# Patient Record
Sex: MALE | URBAN - METROPOLITAN AREA
[De-identification: names, ages, dates, MRNs, and addresses within clinical notes are randomized per-mention and may not be internally consistent; named-entity substitution may affect disease eponyms.]

---

## 2017-01-26 ENCOUNTER — TRANSFERRED RECORDS (OUTPATIENT)
Dept: HEALTH INFORMATION MANAGEMENT | Facility: CLINIC | Age: 32
End: 2017-01-26

## 2017-02-17 ENCOUNTER — TRANSFERRED RECORDS (OUTPATIENT)
Dept: HEALTH INFORMATION MANAGEMENT | Facility: CLINIC | Age: 32
End: 2017-02-17

## 2017-02-22 ENCOUNTER — TRANSFERRED RECORDS (OUTPATIENT)
Dept: HEALTH INFORMATION MANAGEMENT | Facility: CLINIC | Age: 32
End: 2017-02-22

## 2017-02-23 ENCOUNTER — MEDICAL CORRESPONDENCE (OUTPATIENT)
Dept: HEALTH INFORMATION MANAGEMENT | Facility: CLINIC | Age: 32
End: 2017-02-23

## 2017-02-23 ENCOUNTER — TRANSFERRED RECORDS (OUTPATIENT)
Dept: HEALTH INFORMATION MANAGEMENT | Facility: CLINIC | Age: 32
End: 2017-02-23

## 2017-03-03 ENCOUNTER — PRE VISIT (OUTPATIENT)
Dept: UROLOGY | Facility: CLINIC | Age: 32
End: 2017-03-03

## 2017-03-10 ENCOUNTER — TRANSFERRED RECORDS (OUTPATIENT)
Dept: HEALTH INFORMATION MANAGEMENT | Facility: CLINIC | Age: 32
End: 2017-03-10

## 2017-03-22 ENCOUNTER — PRE VISIT (OUTPATIENT)
Dept: OTOLARYNGOLOGY | Facility: CLINIC | Age: 32
End: 2017-03-22

## 2017-03-22 NOTE — TELEPHONE ENCOUNTER
1.  Date/reason for appt: 4/10/17 conductive hearing loss   2.  Referring provider: AUDRA PATEL   3.  Call to patient (Yes / No - short description): no, referral   4.  Previous care at / records requested from: [facility]   5.  Other: Records received from [facility]. Requested image.   Included  Office notes: 3/10/17, 2/17/17, 1/20/17, 12/26/16   Radiology reports: CT temporal bones iacs on 2/22/17  Other: audiogram on 2/17/17, 12/14/16

## 2017-03-22 NOTE — TELEPHONE ENCOUNTER
Radiology reports received from [facility]. Notified the film room to pull images.  CT temporal bones on 2/22/17  CT head on 1/26/17, 1/25/17

## 2017-04-18 ENCOUNTER — OFFICE VISIT (OUTPATIENT)
Dept: UROLOGY | Facility: CLINIC | Age: 32
End: 2017-04-18

## 2017-04-18 VITALS
SYSTOLIC BLOOD PRESSURE: 118 MMHG | HEIGHT: 69 IN | BODY MASS INDEX: 34.8 KG/M2 | DIASTOLIC BLOOD PRESSURE: 73 MMHG | HEART RATE: 52 BPM | WEIGHT: 235 LBS

## 2017-04-18 DIAGNOSIS — N41.1 CHRONIC PROSTATITIS: Primary | ICD-10-CM

## 2017-04-18 LAB
ALBUMIN UR-MCNC: NEGATIVE MG/DL
APPEARANCE UR: CLEAR
BILIRUB UR QL STRIP: NEGATIVE
COLOR UR AUTO: YELLOW
GLUCOSE UR STRIP-MCNC: NEGATIVE MG/DL
HGB UR QL STRIP: NEGATIVE
KETONES UR STRIP-MCNC: NEGATIVE MG/DL
LEUKOCYTE ESTERASE UR QL STRIP: NEGATIVE
MUCOUS THREADS #/AREA URNS LPF: PRESENT /LPF
NITRATE UR QL: NEGATIVE
PH UR STRIP: 6 PH (ref 5–7)
RBC #/AREA URNS AUTO: <1 /HPF (ref 0–2)
SP GR UR STRIP: 1.01 (ref 1–1.03)
URN SPEC COLLECT METH UR: ABNORMAL
UROBILINOGEN UR STRIP-MCNC: 0 MG/DL (ref 0–2)
WBC #/AREA URNS AUTO: <1 /HPF (ref 0–2)

## 2017-04-18 PROCEDURE — 87591 N.GONORRHOEAE DNA AMP PROB: CPT | Performed by: UROLOGY

## 2017-04-18 PROCEDURE — 87491 CHLMYD TRACH DNA AMP PROBE: CPT | Performed by: UROLOGY

## 2017-04-18 RX ORDER — FLUTICASONE PROPIONATE 50 MCG
SPRAY, SUSPENSION (ML) NASAL
COMMUNITY
Start: 2017-02-02

## 2017-04-18 RX ORDER — CETIRIZINE HYDROCHLORIDE 10 MG/1
TABLET ORAL
COMMUNITY
Start: 2017-02-02

## 2017-04-18 RX ORDER — VELPATASVIR AND SOFOSBUVIR 100; 400 MG/1; MG/1
TABLET, FILM COATED ORAL
COMMUNITY
Start: 2017-02-02 | End: 2017-06-08

## 2017-04-18 RX ORDER — IBUPROFEN 600 MG/1
600 TABLET, FILM COATED ORAL EVERY 6 HOURS PRN
Qty: 60 TABLET | Refills: 1 | Status: SHIPPED | OUTPATIENT
Start: 2017-04-18

## 2017-04-18 ASSESSMENT — ENCOUNTER SYMPTOMS
FLANK PAIN: 1
DIZZINESS: 0
INCREASED ENERGY: 1
SYNCOPE: 0
POLYPHAGIA: 0
HEADACHES: 0
HEMATURIA: 0
WEIGHT GAIN: 1
CHILLS: 0
LEG PAIN: 0
COUGH DISTURBING SLEEP: 0
MEMORY LOSS: 0
EXERCISE INTOLERANCE: 0
SPUTUM PRODUCTION: 0
DISTURBANCES IN COORDINATION: 0
SLEEP DISTURBANCES DUE TO BREATHING: 0
ORTHOPNEA: 0
NIGHT SWEATS: 0
SNORES LOUDLY: 0
SHORTNESS OF BREATH: 1
FATIGUE: 1
WEAKNESS: 1
WEIGHT LOSS: 0
DYSURIA: 1
POSTURAL DYSPNEA: 0
SPEECH CHANGE: 0
RESPIRATORY PAIN: 0
HYPERTENSION: 0
TREMORS: 0
DECREASED APPETITE: 0
LOSS OF CONSCIOUSNESS: 0
HALLUCINATIONS: 0
CLAUDICATION: 0
HYPOTENSION: 0
DIFFICULTY URINATING: 1
POLYDIPSIA: 0
TINGLING: 1
PARALYSIS: 0
LIGHT-HEADEDNESS: 1
DYSPNEA ON EXERTION: 1
PALPITATIONS: 1
COUGH: 0
WHEEZING: 0
ALTERED TEMPERATURE REGULATION: 0
HEMOPTYSIS: 0
TACHYCARDIA: 0
FEVER: 0
LEG SWELLING: 0
SEIZURES: 0
NUMBNESS: 0

## 2017-04-18 ASSESSMENT — PAIN SCALES - GENERAL: PAINLEVEL: MILD PAIN (3)

## 2017-04-18 NOTE — MR AVS SNAPSHOT
After Visit Summary   4/18/2017    Mikhail Bustos    MRN: [id number]           Patient Information     Date Of Birth          1985        Visit Information        Provider Department      4/18/2017 2:00 PM Vineet Quintero MD [facility] Urology and [facility] for Prostate and Urologic Cancers        Today's Diagnoses     Chronic prostatitis    -  1      Care Instructions    Schedule imaging and follow up with Dr Quintero to discuss results    It was a pleasure meeting with you today.  Thank you for allowing me and my team the privilege of caring for you today.  YOU are the reason we are here, and I truly hope we provided you with the excellent service you deserve.  Please let us know if there is anything else we can do for you so that we can be sure you are leaving completely satisfied with your care experience.                Follow-ups after your visit        Your next 10 appointments already scheduled     May 04, 2017  2:00 PM CDT   (Arrive by 1:45 PM)   NEW NEUROTOLOGY VISIT with Maria Guadalupe Bashir MD   [facility] Ear Nose and Throat ([facility])    [address] [place] [address] [phone]            May 23, 2017  1:00 PM CDT   (Arrive by 12:45 PM)   XR URETHROGRAM RETROGRADE with UCXR2, UC GIGU [facility] Xray ([facility])    [address] [place] [address] [phone]           Please bring a list of your current medicines to your exam. (Include vitamins, minerals and over-thecounter medicines.) Leave your valuables at home.  Tell your doctor if there is a chance you may be pregnant.  You do not need to do anything special for this exam.            May 23, 2017  1:30 PM CDT   XR CYSTOGRAM VOIDING with UCXR2, UC GIGU RAD   [facility] Imaging Center Xray ([facility])    [address] [address] [phone]            Please bring a list of your current medicines to your exam. (Include vitamins, minerals and over-thecounter medicines.) Leave your valuables at home.  Tell your doctor if there is a chance you may be pregnant.  You do not need to do anything special for this exam.            May 23, 2017  3:00 PM CDT   (Arrive by 2:45 PM)   Return Visit with Vineet Quintero MD   [facility] Urology and [email or web address]] for Prostate and Urologic Cancers ([facility])    [address] [facility] [address] [phone]              Future tests that were ordered for you today     Open Future Orders        Priority Expected Expires Ordered    Routine UA with micro reflex to culture Routine  4/18/2018 4/18/2017    X-Ray Voiding cystogram Routine 4/18/2017 4/18/2018 4/18/2017    XR Urethrogram Retrograde Routine 4/18/2017 4/18/2018 4/18/2017            Who to contact     Please call your clinic at [phone] to:    Ask questions about your health    Make or cancel appointments    Discuss your medicines    Learn about your test results    Speak to your doctor   If you have compliments or concerns about an experience at your clinic, or if you wish to file a complaint, please contact [facility] Patient Relations at [phone] or email us at [email or web address]         Additional Information About Your Visit        SeeSaw Networkshart Information     Eayun is an electronic gateway that provides easy, online access to your medical records. With Eayun, you can request a clinic appointment, read your test results, renew a prescription or communicate with your care team.     To sign up for WiChorust visit the website at [email or web address]   You will be asked to enter the access code listed below, as well as some personal information. Please follow the directions to create your username and password.     Your access code is: [email or web address]  Expires: 6/25/2017  6:30  "AM     Your access code will  in 90 days. If you need help or a new code, please contact your [facility] or call [phone] for assistance.        Care EveryWhere ID     This is your Care EveryWhere ID. This could be used by other organizations to access your [place] medical records  [id number]        Your Vitals Were     Pulse Height BMI (Body Mass Index)             52 1.753 m (5' 9\") 34.7 kg/m2          Blood Pressure from Last 3 Encounters:   17 118/73    Weight from Last 3 Encounters:   17 106.6 kg (235 lb)              We Performed the Following     Chlamydia trachomatis PCR     Neisseria gonorrhoeae PCR          Today's Medication Changes          These changes are accurate as of: 17  3:16 PM.  If you have any questions, ask your nurse or doctor.               Start taking these medicines.        Dose/Directions    ibuprofen 600 MG tablet   Commonly known as:  ADVIL/MOTRIN   Used for:  Chronic prostatitis   Started by:  Vineet Quintero MD        Dose:  600 mg   Take 1 tablet (600 mg) by mouth every 6 hours as needed for moderate pain   Quantity:  60 tablet   Refills:  1            Where to get your medicines      These medications were sent to [facility] - [place], [place] - [address] [address]     Phone:  [phone]     ibuprofen 600 MG tablet                Primary Care Provider Office Phone # Fax #    Ming Cervantes [phone]       [facility]  [address] [facility] [address]        Thank you!     Thank you for choosing [facility] UROLOGY AND [facility] FOR PROSTATE AND UROLOGIC CANCERS  for your care. Our goal is always to provide you with excellent care. Hearing back from our patients is one way we can continue to improve our services. Please take a few minutes to complete the written survey that you may receive in the mail after your visit with us. Thank you!           "   Your Updated Medication List - Protect others around you: Learn how to safely use, store and throw away your medicines at www.disposemymeds.org.          This list is accurate as of: 4/18/17  3:16 PM.  Always use your most recent med list.                   Brand Name Dispense Instructions for use    EPCLUSA 400-100 MG per tablet   Generic drug:  sofosbuvir-velpatasvir          FISH OIL PO          FLONASE 50 MCG/ACT spray   Generic drug:  fluticasone          ibuprofen 600 MG tablet    ADVIL/MOTRIN    60 tablet    Take 1 tablet (600 mg) by mouth every 6 hours as needed for moderate pain       ZYRTEC ALLERGY 10 MG tablet   Generic drug:  cetirizine

## 2017-04-18 NOTE — LETTER
4/18/2017       RE: Mikhail Bustos  [address] [facility] [address]  [facility] [address]     Dear Colleague,    Thank you for referring your patient, Mikhail Bustos, to the [facility] UROLOGY AND INST FOR PROSTATE AND UROLOGIC CANCERS at [facility]. Please see a copy of my visit note below.          Chief Complaint:   Pelvic Pain         Consult or Referral:   Mr. Mikhail Bustos is a 31 year old male seen in consultation from Dr. Haynes         History of Present Illness:    Mikhail Bustos is a very pleasant 31 year old male who presents with a history of lower anterior pelvic pain at the level of the pubic sympysis.  His urologic history leading up to this is somewhat complex.    Symptoms started approx 1 year ago in the context of meth abuse, excess sexual activity and frequent vigorous masterbation with prolonged sessions of sexual arousal and intentionally delayed ejaculation.    During one such session a year ago he was about to have orgasm when he noted immediate detumescence in the penis.  He did feel a pop in the lower abdomen. Was subsequently still able to have erections, sex, no bruising but did have gross hematuria seveal week johnson after having sex.    Subsequent to this experience he endorses near daily/contastant pain at the level of the suspensory ligament of the penis.  He does still have erections and no further hematuria.  He also has vey bothersome overactivity with need to void nearly every hour and sensation that his urinary stream is slow    He had this evaluated by another urologist who performed an extensive  hematuria evaluation with cysto, CT urogram, cytology.  He has also head a head CT and MRI without abnromality.    Has been on flomax with no improevemnt in symotoms    He denies hx of kidney stones, UTI or STD.         Past Medical History:   Hep C         Past Surgical History:   None         Medications     Current Outpatient Prescriptions   Medication  "    sofosbuvir-velpatasvir (EPCLUSA) 400-100 MG per tablet     fluticasone (FLONASE) 50 MCG/ACT spray     cetirizine (ZYRTEC ALLERGY) 10 MG tablet     Omega-3 Fatty Acids (FISH OIL PO)     No current facility-administered medications for this visit.             Family History:   No family history on file.         Social History:     Social History     Social History     Marital status: Single     Spouse name: N/A     Number of children: N/A     Years of education: N/A     Occupational History     Not on file.     Social History Main Topics     Smoking status: Former Smoker     Types: Cigarettes     Quit date: 9/3/2016     Smokeless tobacco: Not on file     Alcohol use Not on file     Drug use: Not on file     Sexual activity: Not on file     Other Topics Concern     Not on file     Social History Narrative     No narrative on file            Allergies:   Augmentin [amoxicillin-pot clavulanate]         Review of Systems:  From intake questionnaire   Negative 14 system review except as noted on HPI, nurse's note.         Physical Exam:   Patient is a 31 year old  male   Vitals: Blood pressure 118/73, pulse 52, height 1.753 m (5' 9\"), weight 106.6 kg (235 lb).  General Appearance Adult: Alert, no acute distress, oriented  HENT: throat/mouth:normal, good dentition  Neck: No adenopathy,masses or thyromegaly  Lungs: no respiratory distress, or pursed lip breathing  Heart: No obvious jugular venous distension present  Abdomen: soft, nontender, no organomegaly or masses, Body mass index is 34.7 kg/(m^2).  Lymphatics: No cervical or supraclavicular adenopathy  Musculoskeltal: extremities normal, no peripheral edema  Skin: no suspicious lesions or rashes  Neuro: Alert, oriented, speech and mentation normal  Psych: affect and mood normal  Gait: Normal  : Normal phallus, no penile plaques, no discharge bilateral descended testes, MELISSA 30 gm slightly boggy,      Uroflow and Post-Void Residual by Bladder Scan     PVR: 88cc       "  Labs and Pathology:    I personally reviewed all applicable laboratory data and went over findings with patient  Significant for:    4/18/17  Urine GC and Chlamidya neg  U/A neg nitr, neg LE, neg blood           Assessment and Plan:     Assessment: 30 yo M with chronic pelvic pain, urinary frequency    Plan:  -Will obtain VCUG/RUG given symptoms and elevated PVR to ensure no urethral stricture  -Instructed to avoid excess sexual activity, one possibility is that he has developed prostatitis from functional obstruction/suppression of ejaculation during excess masterbation      Vineet Quintero MD    CC:  Ming Arce      Answers for HPI/ROS submitted by the patient on 4/18/2017   General Symptoms: Yes  Skin Symptoms: No  HENT Symptoms: No  EYE SYMPTOMS: No  HEART SYMPTOMS: Yes  LUNG SYMPTOMS: Yes  INTESTINAL SYMPTOMS: No  URINARY SYMPTOMS: Yes  REPRODUCTIVE SYMPTOMS: Yes  SKELETAL SYMPTOMS: No  BLOOD SYMPTOMS: No  NERVOUS SYSTEM SYMPTOMS: Yes  MENTAL HEALTH SYMPTOMS: No  Fever: No  Loss of appetite: No  Weight loss: No  Weight gain: Yes  Fatigue: Yes  Night sweats: No  Chills: No  Increased stress: No  Excessive hunger: No  Excessive thirst: No  Feeling hot or cold when others believe the temperature is normal: No  Loss of height: No  Post-operative complications: No  Surgical site pain: No  Hallucinations: No  Change in or Loss of Energy: Yes  Hyperactivity: No  Confusion: Yes  Cough: No  Sputum or phlegm: No  Coughing up blood: No  Difficulty breating or shortness of breath: Yes  Snoring: No  Wheezing: No  Difficulty breathing on exertion: Yes  Respiratory pain: No  Nighttime Cough: No  Difficulty breathing when lying flat: No  Chest pain or pressure: No  Fast or irregular heartbeat: Yes  Pain in legs with walking: No  Swelling in feet or ankles: No  Trouble breathing while lying down: No  Fingers or Toes appear blue: No  High blood pressure: No  Low blood pressure: No  Fainting: No  Murmurs:  No  Chest pain on exertion: No  Chest pain at rest: No  Cramping pain in leg during exercise: No  Pacemaker: No  Varicose veins: No  Edema or swelling: No  Fast heart beat: No  Wake up at night with shortness of breath: No  Heart flutters: Yes  Light-headedness: Yes  Exercise intolerance: No  Trouble holding urine or incontinence: Yes  Pain or burning: Yes  Trouble starting or stopping: Yes  Increased frequency of urination: Yes  Blood in urine: No  Frequent nighttime urination: Yes  Flank pain: Yes  Difficulty emptying bladder: Yes  Trouble with coordination: No  Dizziness or trouble with balance: No  Fainting or black-out spells: No  Memory loss: No  Headache: No  Seizures: No  Speech problems: No  Tingling: Yes  Tremor: No  Weakness: Yes  Difficulty walking: No  Paralysis: No  Numbness: No  Scrotal pain or swelling: Yes  Erectile dysfunction: No  Penile discharge: No  Genital ulcers: No  Reduced libido: Yes      Again, thank you for allowing me to participate in the care of your patient.      Sincerely,    Vineet Quintero MD

## 2017-04-18 NOTE — NURSING NOTE
"Chief Complaint   Patient presents with     Consult     Chronic pelvic pain and frequent urination       Blood pressure 118/73, pulse 52, height 1.753 m (5' 9\"), weight 106.6 kg (235 lb). Body mass index is 34.7 kg/(m^2).    There is no problem list on file for this patient.      Allergies   Allergen Reactions     Augmentin [Amoxicillin-Pot Clavulanate] Hives       Current Outpatient Prescriptions   Medication Sig Dispense Refill     sofosbuvir-velpatasvir (EPCLUSA) 400-100 MG per tablet        fluticasone (FLONASE) 50 MCG/ACT spray        cetirizine (ZYRTEC ALLERGY) 10 MG tablet        Omega-3 Fatty Acids (FISH OIL PO)          Social History   Substance Use Topics     Smoking status: Former Smoker     Types: Cigarettes     Quit date: 9/3/2016     Smokeless tobacco: Not on file     Alcohol use Not on file       DEON Diego  4/18/2017  2:09 PM       "

## 2017-04-18 NOTE — PROGRESS NOTES
Chief Complaint:   Pelvic Pain         Consult or Referral:   Mr. Mikhail Bustos is a 31 year old male seen in consultation from Dr. Haynes         History of Present Illness:    Mikhail Bustos is a very pleasant 31 year old male who presents with a history of lower anterior pelvic pain at the level of the pubic sympysis.  His urologic history leading up to this is somewhat complex.    Symptoms started approx 1 year ago in the context of meth abuse, excess sexual activity and frequent vigorous masterbation with prolonged sessions of sexual arousal and intentionally delayed ejaculation.    During one such session a year ago he was about to have orgasm when he noted immediate detumescence in the penis.  He did feel a pop in the lower abdomen. Was subsequently still able to have erections, sex, no bruising but did have gross hematuria seveal week johnson after having sex.    Subsequent to this experience he endorses near daily/contastant pain at the level of the suspensory ligament of the penis.  He does still have erections and no further hematuria.  He also has vey bothersome overactivity with need to void nearly every hour and sensation that his urinary stream is slow    He had this evaluated by another urologist who performed an extensive  hematuria evaluation with cysto, CT urogram, cytology.  He has also head a head CT and MRI without abnromality.    Has been on flomax with no improevemnt in symotoms    He denies hx of kidney stones, UTI or STD.         Past Medical History:   Hep C         Past Surgical History:   None         Medications     Current Outpatient Prescriptions   Medication     sofosbuvir-velpatasvir (EPCLUSA) 400-100 MG per tablet     fluticasone (FLONASE) 50 MCG/ACT spray     cetirizine (ZYRTEC ALLERGY) 10 MG tablet     Omega-3 Fatty Acids (FISH OIL PO)     No current facility-administered medications for this visit.             Family History:   No family history on file.          "Social History:     Social History     Social History     Marital status: Single     Spouse name: N/A     Number of children: N/A     Years of education: N/A     Occupational History     Not on file.     Social History Main Topics     Smoking status: Former Smoker     Types: Cigarettes     Quit date: 9/3/2016     Smokeless tobacco: Not on file     Alcohol use Not on file     Drug use: Not on file     Sexual activity: Not on file     Other Topics Concern     Not on file     Social History Narrative     No narrative on file            Allergies:   Augmentin [amoxicillin-pot clavulanate]         Review of Systems:  From intake questionnaire   Negative 14 system review except as noted on HPI, nurse's note.         Physical Exam:   Patient is a 31 year old  male   Vitals: Blood pressure 118/73, pulse 52, height 1.753 m (5' 9\"), weight 106.6 kg (235 lb).  General Appearance Adult: Alert, no acute distress, oriented  HENT: throat/mouth:normal, good dentition  Neck: No adenopathy,masses or thyromegaly  Lungs: no respiratory distress, or pursed lip breathing  Heart: No obvious jugular venous distension present  Abdomen: soft, nontender, no organomegaly or masses, Body mass index is 34.7 kg/(m^2).  Lymphatics: No cervical or supraclavicular adenopathy  Musculoskeltal: extremities normal, no peripheral edema  Skin: no suspicious lesions or rashes  Neuro: Alert, oriented, speech and mentation normal  Psych: affect and mood normal  Gait: Normal  : Normal phallus, no penile plaques, no discharge bilateral descended testes, MELISSA 30 gm slightly boggy,      Uroflow and Post-Void Residual by Bladder Scan     PVR: 88cc        Labs and Pathology:    I personally reviewed all applicable laboratory data and went over findings with patient  Significant for:    4/18/17  Urine GC and Chlamidya neg  U/A neg nitr, neg LE, neg blood           Assessment and Plan:     Assessment: 30 yo M with chronic pelvic pain, urinary " frequency    Plan:  -Will obtain VCUG/RUG given symptoms and elevated PVR to ensure no urethral stricture  -Instructed to avoid excess sexual activity, one possibility is that he has developed prostatitis from functional obstruction/suppression of ejaculation during excess masterbation      Vineet Quintero MD    CC:  Ming Arce      Answers for HPI/ROS submitted by the patient on 4/18/2017   General Symptoms: Yes  Skin Symptoms: No  HENT Symptoms: No  EYE SYMPTOMS: No  HEART SYMPTOMS: Yes  LUNG SYMPTOMS: Yes  INTESTINAL SYMPTOMS: No  URINARY SYMPTOMS: Yes  REPRODUCTIVE SYMPTOMS: Yes  SKELETAL SYMPTOMS: No  BLOOD SYMPTOMS: No  NERVOUS SYSTEM SYMPTOMS: Yes  MENTAL HEALTH SYMPTOMS: No  Fever: No  Loss of appetite: No  Weight loss: No  Weight gain: Yes  Fatigue: Yes  Night sweats: No  Chills: No  Increased stress: No  Excessive hunger: No  Excessive thirst: No  Feeling hot or cold when others believe the temperature is normal: No  Loss of height: No  Post-operative complications: No  Surgical site pain: No  Hallucinations: No  Change in or Loss of Energy: Yes  Hyperactivity: No  Confusion: Yes  Cough: No  Sputum or phlegm: No  Coughing up blood: No  Difficulty breating or shortness of breath: Yes  Snoring: No  Wheezing: No  Difficulty breathing on exertion: Yes  Respiratory pain: No  Nighttime Cough: No  Difficulty breathing when lying flat: No  Chest pain or pressure: No  Fast or irregular heartbeat: Yes  Pain in legs with walking: No  Swelling in feet or ankles: No  Trouble breathing while lying down: No  Fingers or Toes appear blue: No  High blood pressure: No  Low blood pressure: No  Fainting: No  Murmurs: No  Chest pain on exertion: No  Chest pain at rest: No  Cramping pain in leg during exercise: No  Pacemaker: No  Varicose veins: No  Edema or swelling: No  Fast heart beat: No  Wake up at night with shortness of breath: No  Heart flutters: Yes  Light-headedness: Yes  Exercise intolerance:  No  Trouble holding urine or incontinence: Yes  Pain or burning: Yes  Trouble starting or stopping: Yes  Increased frequency of urination: Yes  Blood in urine: No  Frequent nighttime urination: Yes  Flank pain: Yes  Difficulty emptying bladder: Yes  Trouble with coordination: No  Dizziness or trouble with balance: No  Fainting or black-out spells: No  Memory loss: No  Headache: No  Seizures: No  Speech problems: No  Tingling: Yes  Tremor: No  Weakness: Yes  Difficulty walking: No  Paralysis: No  Numbness: No  Scrotal pain or swelling: Yes  Erectile dysfunction: No  Penile discharge: No  Genital ulcers: No  Reduced libido: Yes

## 2017-04-19 LAB
C TRACH DNA SPEC QL NAA+PROBE: NORMAL
N GONORRHOEA DNA SPEC QL NAA+PROBE: NORMAL
SPECIMEN SOURCE: NORMAL
SPECIMEN SOURCE: NORMAL

## 2017-05-04 ENCOUNTER — OFFICE VISIT (OUTPATIENT)
Dept: OTOLARYNGOLOGY | Facility: CLINIC | Age: 32
End: 2017-05-04

## 2017-05-04 VITALS — HEIGHT: 69 IN | BODY MASS INDEX: 37.03 KG/M2 | WEIGHT: 250 LBS

## 2017-05-04 DIAGNOSIS — H90.2 CONDUCTIVE HEARING LOSS: Primary | ICD-10-CM

## 2017-05-04 ASSESSMENT — PAIN SCALES - GENERAL: PAINLEVEL: NO PAIN (0)

## 2017-05-04 NOTE — NURSING NOTE
Teaching Flowsheet - ENT   Relevant Diagnosis:  Conductive hearing loss  Teaching Topic:right exploratory tympanotomy possible OCR -cartiledge graft Person(s) involved in teaching:  Patient     Motivation Level:  Asks Questions:   Yes  Eager to Learn:   Yes  Cooperative:   Yes  Receptive (willing/able to accept information):   Yes  Comments: Reviewed pre-op H and P,  NPO prior to  surgery,  pre-op scrub (given Hibiclens)  Reviewed post-op  cares including  ear/wound care, activity and pain.     Patient demonstrates understanding of the following:  Reason for the appointment, diagnosis and treatment plan:   Yes  Knowledge of proper use of medications and conditions for which they are ordered (with special attention to potential side effects or drug interactions):  stop aspirin products 1 week before surgery Yes  Which situations necessitate calling provider and whom to contact:   Yes  Nutritional needs and diet plan:   Yes  Pain management techniques:   Yes  Patient instructed on hand hygiene:  Yes  How and/when to access community resources:   Yes     Infection Prevention:  Patient   demonstrates understanding of the following:  Surgical procedure site care taught Yes  Signs and symptoms of infection taught Yes  Wound care taught Yes  Instructional Materials Used/Given: pre- op booklet, ear surgery  handout and verbal  Instruction.  Pt at treatment at [facility].    a. Does the patient take five or more prescription medications? No  b. Does patient have difficulty walking up two flights of stairs? No  c. Is patient s BMI 35 or greater? No  d. Is patient an insulin dependent diabetic? No  e. Does patient have a history of having a heart attack or a heart surgery of any kind? No  f. Does patient have a history of heart failure? No  g. Does the patient have a history of any type of transplant? No  h. Does the patient use inhalers on a daily basis? No  i. Does the patient have a history of pulmonary hypertension?  No  Once the patient is evaluated by PAC contact (ex: Surgery schedulers name and number, RN's name and number, etc..);    Name of planned surgery______________as above________________

## 2017-05-04 NOTE — LETTER
5/4/2017       RE: Mikhail Bustos  [address]  [facility] [address]     Dear Colleague,    Thank you for referring your patient, Mikhail Bustos, to the [facility] EAR NOSE AND THROAT at [facility]. Please see a copy of my visit note below.    Mikhail Bustos is seen in consultation from Dr. Shad Church.  He is a 31 year old male being seen for right-sided conductive hearing loss. He has a very interesting history of a hearing loss on the right present since childhood that fluctuates in severity. He states that when he insufflates his middle ear or thrusts his mandible forward, he will have about 5-10 seconds of improved hearing that is equal to the contralateral side. He does have a history of PE tubes placed as a child. Per review of Dr. Church's notes, it was mentioned that he may require surgery in the future to correct his ossicular chain. When he initially presented for evaluation of the hearing loss, he was noted to have a flat tympanogram on that side and a myringotomy was performed which did not identify an fluid. A CT temporal bone was obtained given the unusual history which did not identify any gross abnormalities. Dr. Church offered an exploratory tympanotomy, but he preferred to have a second opinion prior to operating.     PAST MEDICAL HISTORY:  1. Hepatitis C - he has just completed therapy for this  2. Recurrent otitis media as a child  3. Alcohol and methamphetamine abuse    PAST SURGICAL HISTORY:  1. PE tubes as a child  2. ORIF clavicle and mandible    MEDICATIONS:  1. Flonase  2. Cetirizine    ALLERGIES: Augmentin    SOCIAL HISTORY: he has an extensive history of substance abuse, though he is currently attending [employer] in [place], [place]. Former smoker.     FAMILY HISTORY: no family history of hearing loss    Patient Supplied Answers to Review of Systems   ENT ROS 5/4/2017   Gastrointestinal/Genitourinary Pain with urination    Endocrine Frequent urination   The remainder of the 10 point review of systems is otherwise negative.    Physical examination:  Constitutional:  In no acute distress, appears stated age  Eyes:  Extraocular movements intact, no spontaneous nystagmus  Ears:  Both ears examined under the microscope.  The ear canals are lined with healthy skin. The left tympanic membrane is intact without any perforations or retractions. There is no fluid in the middle ear. On the right, there is myringosclerosis of the anterior portion of the membrane. There does not appear to be any fluid in the middle ear. The manubrium and neck of the malleus are seen and appear normal. Other ossicles are not well visualized through the membrane.  Respiratory:  No increased work of breathing, wheezing or stridor  Musculoskeletal:  Good upper extremity strength  Skin:  No rashes on the head and neck  Neurologic:  House Brackman 1/6 bilaterally, ambulating normally  Psychiatric:  Alert, normal affect, answering questions appropriately    Audiogram:  Outside audiograms are reviewed. Audiogram dated 12/14/16 shows normal hearing on the left and moderate sloping to severe mixed hearing loss, though primarily conductive. Tympanogram on the right was type B at that time. Audiogram on 2/17/17 show a moderate sloping to severe conductive hearing loss (bone threshold at 4 kHz improved 5 dB). Tympanogram on the right was reported as type C.     Imaging: CT temporal bone dated 2/22/2017 is reviewed. The cochlea and labyrinth appear normal. The facial nerve follows the usual course and does not appear dehiscent. The malleus is normal in appearance. The long process of the incus does not appear present and there is no appreciable articulation with the stapes. The mastoid is fairly well pneumatized and well aerated.     Assessment and plan:  Mikhail Bustos is a 31-year-old male with a right-sided conductive hearing loss which by patient report, fluctuates in  severity. 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 The remainder of the ossicular chain appears normal. We discussed these findings with the patient today. We discussed our hesitancy to perform middle ear exploration in patients with congenital malformation as these are associated with higher risk for postoperative sensorineural hearing loss. Understanding this risk, he still wishes to proceed with surgery. We discussed the risks of the procedure which include the high risk of permanent hearing loss, damage to the facial nerve and nerve of taste, and failure to improve his hearing. Depending on the intraoperative findings, we may or may not attempt to restore continuity of the ossicular chain and this depends on the degree of abnormalities. He seemed to understand this. We will schedule him for exploratory tympanotomy, possible OCR, and possible cartilage graft harvest. He was amenable to this plan and all questions were answered.    Doni Lal MD  Resident Physician  Otolaryngology - Head & Neck Surgery     I, Maria Guadalupe Bashir, saw this patient with the resident/fellow and agree with the resident s findings and plan of care as documented in the resident s/fellow s note. I was present for the entire procedure.    Maria Guadalupe Bashir MD

## 2017-05-04 NOTE — PROGRESS NOTES
Mikhail Bustos is seen in consultation from Dr. Shad Church.  He is a 31 year old male being seen for right-sided conductive hearing loss. He has a very interesting history of a hearing loss on the right present since childhood that fluctuates in severity. He states that when he insufflates his middle ear or thrusts his mandible forward, he will have about 5-10 seconds of improved hearing that is equal to the contralateral side. He does have a history of PE tubes placed as a child. Per review of Dr. Church's notes, it was mentioned that he may require surgery in the future to correct his ossicular chain. When he initially presented for evaluation of the hearing loss, he was noted to have a flat tympanogram on that side and a myringotomy was performed which did not identify an fluid. A CT temporal bone was obtained given the unusual history which did not identify any gross abnormalities. Dr. Church offered an exploratory tympanotomy, but he preferred to have a second opinion prior to operating.     PAST MEDICAL HISTORY:  1. Hepatitis C - he has just completed therapy for this  2. Recurrent otitis media as a child  3. Alcohol and methamphetamine abuse    PAST SURGICAL HISTORY:  1. PE tubes as a child  2. ORIF clavicle and mandible    MEDICATIONS:  1. Flonase  2. Cetirizine    ALLERGIES: Augmentin    SOCIAL HISTORY: he has an extensive history of substance abuse, though he is currently attending [employer] in [place], [place]. Former smoker.     FAMILY HISTORY: no family history of hearing loss    Patient Supplied Answers to Review of Systems   ENT ROS 5/4/2017   Gastrointestinal/Genitourinary Pain with urination   Endocrine Frequent urination   The remainder of the 10 point review of systems is otherwise negative.    Physical examination:  Constitutional:  In no acute distress, appears stated age  Eyes:  Extraocular movements intact, no spontaneous nystagmus  Ears:  Both ears examined under the  microscope.  The ear canals are lined with healthy skin. The left tympanic membrane is intact without any perforations or retractions. There is no fluid in the middle ear. On the right, there is myringosclerosis of the anterior portion of the membrane. There does not appear to be any fluid in the middle ear. The manubrium and neck of the malleus are seen and appear normal. Other ossicles are not well visualized through the membrane.  Respiratory:  No increased work of breathing, wheezing or stridor  Musculoskeletal:  Good upper extremity strength  Skin:  No rashes on the head and neck  Neurologic:  House Brackman 1/6 bilaterally, ambulating normally  Psychiatric:  Alert, normal affect, answering questions appropriately    Audiogram:  Outside audiograms are reviewed. Audiogram dated 12/14/16 shows normal hearing on the left and moderate sloping to severe mixed hearing loss, though primarily conductive. Tympanogram on the right was type B at that time. Audiogram on 2/17/17 show a moderate sloping to severe conductive hearing loss (bone threshold at 4 kHz improved 5 dB). Tympanogram on the right was reported as type C.     Imaging: CT temporal bone dated 2/22/2017 is reviewed. The cochlea and labyrinth appear normal. The facial nerve follows the usual course and does not appear dehiscent. The malleus is normal in appearance. The long process of the incus does not appear present and there is no appreciable articulation with the stapes. The mastoid is fairly well pneumatized and well aerated.     Assessment and plan:  Mikhail Bustos is a 31-year-old male with a right-sided conductive hearing loss which by patient report, fluctuates in severity. 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 The remainder of the ossicular chain appears normal. We discussed these findings with the  patient today. We discussed our hesitancy to perform middle ear exploration in patients with congenital malformation as these are associated with higher risk for postoperative sensorineural hearing loss. Understanding this risk, he still wishes to proceed with surgery. We discussed the risks of the procedure which include the high risk of permanent hearing loss, damage to the facial nerve and nerve of taste, and failure to improve his hearing. Depending on the intraoperative findings, we may or may not attempt to restore continuity of the ossicular chain and this depends on the degree of abnormalities. He seemed to understand this. We will schedule him for exploratory tympanotomy, possible OCR, and possible cartilage graft harvest. He was amenable to this plan and all questions were answered.    Doni Lal MD  Resident Physician  Otolaryngology - Head & Neck Surgery     I, Maria Guadalupe Bashir, saw this patient with the resident/fellow and agree with the resident s findings and plan of care as documented in the resident s/fellow s note. I was present for the entire procedure.    Maria Guadalupe Bashir MD

## 2017-05-04 NOTE — MR AVS SNAPSHOT
After Visit Summary   5/4/2017    Mikhail Bustos    MRN: [id number]           Patient Information     Date Of Birth          1985        Visit Information        Provider Department      5/4/2017 2:00 PM Maria Guadalupe Bashir MD [facility] Ear Nose and Throat        Today's Diagnoses     Conductive hearing loss    -  1      Care Instructions     Patient to review pre operative information.   Patient to schedule a pre-op physical with their primary MD or with our Preoperative Assessment Clinic within 30 days of the procedure.    Patient to avoid blood thinning medications 1 week prior to surgery (Ibuprofen, Aleve, Aspirin, fish oil )   Use the antiseptic scrub as directed.   You will need to schedule a post operative appointment for 3 weeks after surgery    Patient to call the ENT clinic with further questions or concerns:   ENT Triage Nurse  [phone] option 3  ENT appointment scheduling [phone] option 1  ENT surgery scheduling, [facility] [phone]  ENT Nurse Phuong [phone]        Follow-ups after your visit        Your next 10 appointments already scheduled     May 23, 2017  1:00 PM CDT   (Arrive by 12:45 PM)   XR URETHROGRAM RETROGRADE with UCXR2, ContraqerU [facility] Xray ([facility])    [address] [place] [address] [phone]           Please bring a list of your current medicines to your exam. (Include vitamins, minerals and over-thecounter medicines.) Leave your valuables at home.  Tell your doctor if there is a chance you may be pregnant.  You do not need to do anything special for this exam.            May 23, 2017  1:30 PM CDT   XR CYSTOGRAM VOIDING with UCXR2, UC Trempstar TacticalU [facility] Xray ([facility])    [address] [address] [phone]           Please bring a list of your current medicines to your exam. (Include vitamins,  minerals and over-thecounter medicines.) Leave your valuables at home.  Tell your doctor if there is a chance you may be pregnant.  You do not need to do anything special for this exam.            May 23, 2017  3:00 PM CDT   (Arrive by 2:45 PM)   Return Visit with Vineet Quintero MD   [facility]rology and [facility] ([facility])    [address] [facility] [address] [phone]              Who to contact     Please call your clinic at [phone] to:    Ask questions about your health    Make or cancel appointments    Discuss your medicines    Learn about your test results    Speak to your doctor   If you have compliments or concerns about an experience at your clinic, or if you wish to file a complaint, please contact [facility] Patient Relations at [phone] or email us at [email or web address]         Additional Information About Your Visit        AfterYesharCalastone Information     Imaxio is an electronic gateway that provides easy, online access to your medical records. With Imaxio, you can request a clinic appointment, read your test results, renew a prescription or communicate with your care team.     To sign up for Imaxio visit the website at [email or web address]   You will be asked to enter the access code listed below, as well as some personal information. Please follow the directions to create your username and password.     Your access code is: [email or web address]  Expires: 2017  6:30 AM     Your access code will  in 90 days. If you need help or a new code, please contact your [facility] Clinic or call [phone] for assistance.        Care EveryWhere ID     This is your Care EveryWhere ID. This could be used by other organizations to access your [place] medical records  [id number]        Your Vitals Were     Height BMI (Body Mass Index)              "   1.75 m (5' 8.9\") 37.03 kg/m2           Blood Pressure from Last 3 Encounters:   04/18/17 118/73    Weight from Last 3 Encounters:   05/04/17 113.4 kg (250 lb)   04/18/17 106.6 kg (235 lb)              We Performed the Following     Tamar-Operative Worksheet        Primary Care Provider Office Phone # Fax #    Ming Cervantes [phone]       [facility] [address] [facility] [address]        Thank you!     Thank you for choosing [facility] EAR NOSE AND THROAT  for your care. Our goal is always to provide you with excellent care. Hearing back from our patients is one way we can continue to improve our services. Please take a few minutes to complete the written survey that you may receive in the mail after your visit with us. Thank you!             Your Updated Medication List - Protect others around you: Learn how to safely use, store and throw away your medicines at www.disposemymeds.org.          This list is accurate as of: 5/4/17  2:03 PM.  Always use your most recent med list.                   Brand Name Dispense Instructions for use    EPCLUSA 400-100 MG per tablet   Generic drug:  sofosbuvir-velpatasvir          FISH OIL PO          FLONASE 50 MCG/ACT spray   Generic drug:  fluticasone          ibuprofen 600 MG tablet    ADVIL/MOTRIN    60 tablet    Take 1 tablet (600 mg) by mouth every 6 hours as needed for moderate pain       ZYRTEC ALLERGY 10 MG tablet   Generic drug:  cetirizine            "

## 2017-05-04 NOTE — NURSING NOTE
Chief Complaint   Patient presents with     Consult     Conductive hearing loss right ear     Hiram Pink LPN

## 2017-05-04 NOTE — PATIENT INSTRUCTIONS
Patient to review pre operative information.   Patient to schedule a pre-op physical with their primary MD or with our Preoperative Assessment Clinic within 30 days of the procedure.    Patient to avoid blood thinning medications 1 week prior to surgery (Ibuprofen, Aleve, Aspirin, fish oil )   Use the antiseptic scrub as directed.   You will need to schedule a post operative appointment for 3 weeks after surgery    Patient to call the ENT clinic with further questions or concerns:   ENT Triage Nurse  [phone] option 3  ENT appointment scheduling [phone] option 1  ENT surgery scheduling, [facility] [phone]  ENT Nurse Phuong [phone]

## 2017-05-10 ENCOUNTER — PRE VISIT (OUTPATIENT)
Dept: UROLOGY | Facility: CLINIC | Age: 32
End: 2017-05-10

## 2017-05-10 NOTE — TELEPHONE ENCOUNTER
Patient coming in to discuss VCUG/RUG which are scheduled prior to visit. Patient last saw Dr Quintero on 4/18/17. Records/orders in epic. No need to contact patient

## 2017-05-17 ENCOUNTER — TELEPHONE (OUTPATIENT)
Dept: OTOLARYNGOLOGY | Facility: CLINIC | Age: 32
End: 2017-05-17

## 2017-05-17 NOTE — TELEPHONE ENCOUNTER
Coordinator/ Rn Renata at [place] [employer] [facility] [employer] [facility] calls for information regarding expectations for pain after surgery scheduled 6/9, sent note and discussed necessity timing- they will review with the guidelines, and  I also said we'd respect and follow any pain medication guidelines going forward. 1 hour procedure, approx 6-7 hours here at the U , post op appt, possible na=rcotic pain medications for 2-3 days  Fax [phone]

## 2017-05-23 ENCOUNTER — OFFICE VISIT (OUTPATIENT)
Dept: UROLOGY | Facility: CLINIC | Age: 32
End: 2017-05-23

## 2017-05-23 VITALS
DIASTOLIC BLOOD PRESSURE: 83 MMHG | BODY MASS INDEX: 34.8 KG/M2 | SYSTOLIC BLOOD PRESSURE: 131 MMHG | HEIGHT: 69 IN | WEIGHT: 235 LBS | HEART RATE: 55 BPM

## 2017-05-23 DIAGNOSIS — R10.2 PELVIC PAIN IN MALE: Primary | ICD-10-CM

## 2017-05-23 ASSESSMENT — PAIN SCALES - GENERAL: PAINLEVEL: NO PAIN (0)

## 2017-05-23 NOTE — PATIENT INSTRUCTIONS
Physical Therapy has been ordered for you. Return in 4 months to evaluate progress.    It was a pleasure meeting with you today.  Thank you for allowing me and my team the privilege of caring for you today.  YOU are the reason we are here, and I truly hope we provided you with the excellent service you deserve.  Please let us know if there is anything else we can do for you so that we can be sure you are leaving completely satisfied with your care experience.

## 2017-05-23 NOTE — LETTER
"5/23/2017       RE: Mikhail Bustos  [address]  [facility] [address]     Dear Colleague,    Thank you for referring your patient, Mikhail Bustos, to the [facility] UROLOGY AND INST FOR PROSTATE AND UROLOGIC CANCERS at [facility]. Please see a copy of my visit note below.             UROLOGY FOLLOW-UP NOTE        Chief Complaint:   Today I had the pleasure of seeing . Mikhail Bustos in follow-up for a chief complaint of chronic pelvic pain         Interval Update    Mikhail Bustos is a very pleasant 32 year old male last seen 6 weeks ago.  At our last visit he described very bothersome dysuria, urinary frequency and anterior lower pelvic pain.  Since last being seen his pain has remained constant.  He has no further hematuria.  He has previously had an extensive evaluation for urinary symptoms with no suggestion of cause of pain or bleeding.  He has a known history of illicit drug use and hypersexual activity and believes this may be a contributing factor as symptoms appear to have coincided with prolonged periods of masturbation and intentionally delayed ejaculation.         Physical Exam:   Patient is a 32 year old  male   Vitals: Blood pressure 131/83, pulse 55, height 1.753 m (5' 9\"), weight 106.6 kg (235 lb).  General Appearance Adult: Alert, no acute distress, oriented  HENT: throat/mouth:normal, good dentition  Neck: No adenopathy,masses or thyromegaly  Lungs: no respiratory distress, or pursed lip breathing  Heart: No obvious jugular venous distension present  Abdomen: soft, nontender, no organomegaly or masses, Body mass index is 34.7 kg/(m^2).  Lymphatics: No cervical or supraclavicular adenopathy  Musculoskeltal: extremities normal, no peripheral edema  Skin: no suspicious lesions or rashes  Neuro: Alert, oriented, speech and mentation normal  Psych: affect and mood normal  Gait: Normal      Labs and Pathology:    I personally reviewed all applicable " laboratory data and went over findings with patient  Significant for:  UA RESULTS:   Recent Labs   Lab Test  04/18/17   1400   SG  1.009   URINEPH  6.0   NITRITE  Negative   RBCU  <1   WBCU  <1         Imaging:    I personally reviewed all applicable imaging and went over findings with patient.  Significant for VCUG/RUG 5/23  Retrograde urethrogram:  Balloon tipped catheter was inserted into the distal penile urethra.  At any contrast was injected and multiple fluoroscopic images obtained  of the anterior urethra. These demonstrate some mild irregularity of  the proximal penile urethra without significant or focal narrowing.  The narrowest portion of the urethra measures 4 mm in diameter.     Voiding cystourethrogram:  Bassett catheter could not be passed into the bladder. Access was gained  using a 0.035 Bentson wire and 4 Frisian Kumpe catheter. Kumpe was  exchanged over the wire for a 12 Frisian balloon tipped catheter.  Approximately 500 mL of Isovue 250 was then instilled under gravity  into the urinary bladder.     This provided adequate distention. No vesicoureteral reflux was  identified.     Fluoroscopic images obtained during voiding demonstrate some reflux  into the ejaculatory ducts. There is no significant prostatic urethra  dilatation. Apparent narrowing of the membranous urethra, favored to  be related to incomplete relaxation of the urogenital diaphragm rather  than a stricture.         IMPRESSION:  No significant urethra stricture identified. Apparent narrowing of the  membranous urethra without upstream dilatation, favored to be related  to incomplete relaxation of the urogenital diaphragm rather than a  stricture.           Past Medical History:     Past Medical History:   Diagnosis Date     Hearing problem             Past Surgical History:   No past surgical history on file.         Medications     Current Outpatient Prescriptions   Medication     sofosbuvir-velpatasvir (EPCLUSA) 400-100 MG per  tablet     fluticasone (FLONASE) 50 MCG/ACT spray     cetirizine (ZYRTEC ALLERGY) 10 MG tablet     Omega-3 Fatty Acids (FISH OIL PO)     ibuprofen (ADVIL/MOTRIN) 600 MG tablet     No current facility-administered medications for this visit.             Family History:     Family History   Problem Relation Age of Onset     CANCER Father      DIABETES Father      CEREBROVASCULAR DISEASE Father      DIABETES Mother             Social History:     Social History     Social History     Marital status: Single     Spouse name: N/A     Number of children: N/A     Years of education: N/A     Occupational History     Not on file.     Social History Main Topics     Smoking status: Former Smoker     Packs/day: 1.00     Years: 10.00     Types: Cigarettes, Other     Quit date: 9/3/2016     Smokeless tobacco: Never Used     Alcohol use Not on file     Drug use: Yes     Special: Marijuana, Methamphetamines, IV     Sexual activity: Not Currently     Partners: Female     Birth control/ protection: Pull-out method     Other Topics Concern     Not on file     Social History Narrative            Allergies:   Augmentin [amoxicillin-pot clavulanate]         Review of Systems:  From intake questionnaire   Negative 14 system review except as noted on HPI, nurse's note.           Assessment/Plan   31 yo M with chronic pelvic pain, dysuria  -No clear etiology identified at this time  -Offered anticholinergic to see if it improves symptoms, he deferred stating he has tried this with no improvement  -Suspect some possible component of pelvic floor dysfunction, recommend pelvic floor rehab and referral to pain medicine for alternative etiologies/treatemnt  -If no improvement will plan to repeat cysto in the future  Vineet Quintero MD  CC:  Ming Cervantes

## 2017-05-23 NOTE — MR AVS SNAPSHOT
After Visit Summary   5/23/2017    Mikhail Bustos    MRN: [id number]           Patient Information     Date Of Birth          1985        Visit Information        Provider Department      5/23/2017 3:00 PM Vineet Quintero MD [facility] Urology and [facility] for Prostate and Urologic Cancers        Today's Diagnoses     Pelvic pain in male    -  1      Care Instructions    Physical Therapy has been ordered for you. Return in 4 months to evaluate progress.    It was a pleasure meeting with you today.  Thank you for allowing me and my team the privilege of caring for you today.  YOU are the reason we are here, and I truly hope we provided you with the excellent service you deserve.  Please let us know if there is anything else we can do for you so that we can be sure you are leaving completely satisfied with your care experience.                Follow-ups after your visit        Additional Services     [facility] Physical Therapy Referral       **This order will print in the [facility] Scheduling Office**    Physical Therapy, Hand Therapy and Chiropractic Care are available through:    *[facility]  *[facility]  *[facility]    Call one number to schedule at any of the above locations: [phone].    Your provider has referred you to: Physical Therapy at [facility] or [facility]    Indication/Reason for Referral: Men's Health (Please Complete Special Programs SmartList)  Onset of Illness: 3 months ago  Therapy Orders: Evaluate and Treat  Special Programs: Men's Health: Pelvic Pain - Specific Diagnosis: pelvic  Special Request: None    Ilya Caraballo      Additional Comments for the Therapist or Chiropractor:     Please be aware that coverage of these services is subject to the terms and limitations of your health insurance plan.  Call member services at your health plan with any benefit or coverage questions.      Please bring the following to your appointment:    *Your  personal calendar for scheduling future appointments  *Comfortable clothing            [facility] REFERRAL       Your provider has referred you to: [facility] for Comprehensive Pain Management. Please call [phone] to make an appointment.     Clinic is located: [facility] [address] [place] [address] [id number] 4th [place] [address]      Please complete the following questions:    Procedure/Referral: Referral Only -  Comprehensive Evaluation and Management    What is your diagnosis for the patient's pain? Chronic pelvic pain      What are your specific questions for the pain specialist? Are there any procedures/blocks that can be administered for patients symptosm    Are there any red flags that may impact the assessment or management of the patient? Active or recent alcohol, drug or prescription medication misuse (explain): recent methamphetamine    REGARDING OPIOID MEDICATIONS:  We will always address appropriateness of opioid pain medications. We do not prescribe on the patient's first visit and generally will not take on a prescribing role for stable chronic pain. When we do take on prescribing of opioids for chronic pain, it is in collaboration with the referring physician for an determined period of time (usually weeks to months), with an expectation that the primary physician or provider will assume the prescribing role if medications are effective at stable doses with demonstrated compliance.  Therefore, please do not assume that your prescribing responsibilities end on the day of pain clinic consultation.  Is this agreeable to you? YES      Please be aware that coverage of these services is subject to the terms and limitations of your health insurance plan.  Call member services at your health plan with any benefit or coverage questions.      Please bring the following with you to your appointment or have sent to the [facility] Pain  Clinic:    (1) Any X-Rays, CTs or MRIs which have been performed that are not in Epic.  Contact the facility where they were done to arrange for  prior to your scheduled appointment.  Any new CT, MRI or other procedures ordered by your specialist must be performed at a [facility] facility or coordinated by your clinic's referral office.    (2) List of current medications   (3) This referral request   (4) Any documents/labs given to you for this referral                  Your next 10 appointments already scheduled     Jun 07, 2017 10:00 AM CDT   (Arrive by 9:45 AM)   PAC PHONE RN ASSESSMENT with Uc Pac Rn   [facility] ([facility])    [address] [place] [address]  [address]th [facility] [address] [phone]           Note: this is not an onsite visit; there is no need to come to the facility.            Jun 09, 2017   Procedure with Maria Guadalupe Bashir MD   [facility] ([facility])    [address] [place] [address]  5th [facility] [address] [phone]           Located in the [facility] at [address] [place] [address].   parking is very convenient and highly recommended.  is a $6 flat rate fee.  Both  and self parkers should enter the main arrival plaza from [facility]; parking attendants will direct you based on your parking preference.            Jun 29, 2017  2:45 PM CDT   (Arrive by 2:30 PM)   Return Visit with Maria Guadalupe Bashir MD   [facility] Nose [facility] Throat ([facility])    [address] [place] [address]  [address]th [facility] [address] [phone]              Who to contact     Please call your clinic at [phone] to:    Ask questions about your health    Make or cancel appointments    Discuss your medicines    Learn about your test results    Speak to your doctor   If you have compliments or concerns about an experience at your clinic, or  "if you wish to file a complaint, please contact [facility] Patient Relations at [phone] or email us at [email or web address]         Additional Information About Your Visit        [employer] [facility] Information     [facility] is an electronic gateway that provides easy, online access to your medical records. With [facility] [employer], you can request a clinic appointment, read your test results, renew a prescription or communicate with your care team.     To sign up for [employer] [facility] [employer] visit the website at [email or web address]   You will be asked to enter the access code listed below, as well as some personal information. Please follow the directions to create your username and password.     Your access code is: [email or web address]  Expires: 2017  6:30 AM     Your access code will  in 90 days. If you need help or a new code, please contact your [facility] or call [phone] for assistance.        Care EveryWhere ID     This is your Care EveryWhere ID. This could be used by other organizations to access your [facility] medical records  [id number]        Your Vitals Were     Pulse Height BMI (Body Mass Index)             55 1.753 m (5' 9\") 34.7 kg/m2          Blood Pressure from Last 3 Encounters:   17 131/83   17 118/73    Weight from Last 3 Encounters:   17 106.6 kg (235 lb)   17 113.4 kg (250 lb)   17 106.6 kg (235 lb)              We Performed the Following     [facility] Physical Therapy Referral     [facility] REFERRAL        Primary Care Provider Office Phone # Fax #    Ming SHANT Cervantes [phone]       [facility] [address] [facility] [address]        Thank you!     Thank you for choosing [facility] UROLOGY AND [facility] FOR PROSTATE AND UROLOGIC CANCERS  for your care. Our goal is always to provide you with excellent care. Hearing back from our patients is one way we can continue to " improve our services. Please take a few minutes to complete the written survey that you may receive in the mail after your visit with us. Thank you!             Your Updated Medication List - Protect others around you: Learn how to safely use, store and throw away your medicines at www.disposemymeds.org.          This list is accurate as of: 5/23/17  3:45 PM.  Always use your most recent med list.                   Brand Name Dispense Instructions for use    EPCLUSA 400-100 MG per tablet   Generic drug:  sofosbuvir-velpatasvir          FISH OIL PO          FLONASE 50 MCG/ACT spray   Generic drug:  fluticasone          ibuprofen 600 MG tablet    ADVIL/MOTRIN    60 tablet    Take 1 tablet (600 mg) by mouth every 6 hours as needed for moderate pain       ZYRTEC ALLERGY 10 MG tablet   Generic drug:  cetirizine

## 2017-05-30 NOTE — PROGRESS NOTES
"         UROLOGY FOLLOW-UP NOTE          Chief Complaint:   Today I had the pleasure of seeing Mr. Mikhail Bustos in follow-up for a chief complaint of chronic pelvic pain         Interval Update    Mikhail Bustos is a very pleasant 32 year old male last seen 6 weeks ago.  At our last visit he described very bothersome dysuria, urinary frequency and anterior lower pelvic pain.  Since last being seen his pain has remained constant.  He has no further hematuria.  He has previously had an extensive evaluation for urinary symptoms with no suggestion of cause of pain or bleeding.  He has a known history of illicit drug use and hypersexual activity and believes this may be a contributing factor as symptoms appear to have coincided with prolonged periods of masturbation and intentionally delayed ejaculation.           Physical Exam:   Patient is a 32 year old  male   Vitals: Blood pressure 131/83, pulse 55, height 1.753 m (5' 9\"), weight 106.6 kg (235 lb).  General Appearance Adult: Alert, no acute distress, oriented  HENT: throat/mouth:normal, good dentition  Neck: No adenopathy,masses or thyromegaly  Lungs: no respiratory distress, or pursed lip breathing  Heart: No obvious jugular venous distension present  Abdomen: soft, nontender, no organomegaly or masses, Body mass index is 34.7 kg/(m^2).  Lymphatics: No cervical or supraclavicular adenopathy  Musculoskeltal: extremities normal, no peripheral edema  Skin: no suspicious lesions or rashes  Neuro: Alert, oriented, speech and mentation normal  Psych: affect and mood normal  Gait: Normal      Labs and Pathology:    I personally reviewed all applicable laboratory data and went over findings with patient  Significant for:  UA RESULTS:   Recent Labs   Lab Test  04/18/17   1400   SG  1.009   URINEPH  6.0   NITRITE  Negative   RBCU  <1   WBCU  <1         Imaging:    I personally reviewed all applicable imaging and went over findings with patient.  Significant for VCUG/RUG " 5/23  Retrograde urethrogram:  Balloon tipped catheter was inserted into the distal penile urethra.  At any contrast was injected and multiple fluoroscopic images obtained  of the anterior urethra. These demonstrate some mild irregularity of  the proximal penile urethra without significant or focal narrowing.  The narrowest portion of the urethra measures 4 mm in diameter.     Voiding cystourethrogram:  Bassett catheter could not be passed into the bladder. Access was gained  using a 0.035 Bentson wire and 4 Indian Kumpe catheter. Kumpe was  exchanged over the wire for a 12 Indian balloon tipped catheter.  Approximately 500 mL of Isovue 250 was then instilled under gravity  into the urinary bladder.     This provided adequate distention. No vesicoureteral reflux was  identified.     Fluoroscopic images obtained during voiding demonstrate some reflux  into the ejaculatory ducts. There is no significant prostatic urethra  dilatation. Apparent narrowing of the membranous urethra, favored to  be related to incomplete relaxation of the urogenital diaphragm rather  than a stricture.         IMPRESSION:  No significant urethra stricture identified. Apparent narrowing of the  membranous urethra without upstream dilatation, favored to be related  to incomplete relaxation of the urogenital diaphragm rather than a  stricture.           Past Medical History:     Past Medical History:   Diagnosis Date     Hearing problem             Past Surgical History:   No past surgical history on file.         Medications     Current Outpatient Prescriptions   Medication     sofosbuvir-velpatasvir (EPCLUSA) 400-100 MG per tablet     fluticasone (FLONASE) 50 MCG/ACT spray     cetirizine (ZYRTEC ALLERGY) 10 MG tablet     Omega-3 Fatty Acids (FISH OIL PO)     ibuprofen (ADVIL/MOTRIN) 600 MG tablet     No current facility-administered medications for this visit.             Family History:     Family History   Problem Relation Age of Onset      CANCER Father      DIABETES Father      CEREBROVASCULAR DISEASE Father      DIABETES Mother             Social History:     Social History     Social History     Marital status: Single     Spouse name: N/A     Number of children: N/A     Years of education: N/A     Occupational History     Not on file.     Social History Main Topics     Smoking status: Former Smoker     Packs/day: 1.00     Years: 10.00     Types: Cigarettes, Other     Quit date: 9/3/2016     Smokeless tobacco: Never Used     Alcohol use Not on file     Drug use: Yes     Special: Marijuana, Methamphetamines, IV     Sexual activity: Not Currently     Partners: Female     Birth control/ protection: Pull-out method     Other Topics Concern     Not on file     Social History Narrative            Allergies:   Augmentin [amoxicillin-pot clavulanate]         Review of Systems:  From intake questionnaire   Negative 14 system review except as noted on HPI, nurse's note.           Assessment/Plan   31 yo M with chronic pelvic pain, dysuria  -No clear etiology identified at this time  -Offered anticholinergic to see if it improves symptoms, he deferred stating he has tried this with no improvement  -Suspect some possible component of pelvic floor dysfunction, recommend pelvic floor rehab and referral to pain medicine for alternative etiologies/treatemnt  -If no improvement will plan to repeat cysto in the future      Vineet Quintero MD    CC:  Ming Cervantes      >15 minutes were spent face to face with patient over half of which was spent providing medical counseling regarding lower urinary tract symptoms

## 2017-06-07 ENCOUNTER — APPOINTMENT (OUTPATIENT)
Dept: SURGERY | Facility: CLINIC | Age: 32
End: 2017-06-07

## 2017-06-09 ENCOUNTER — ANESTHESIA (OUTPATIENT)
Dept: SURGERY | Facility: AMBULATORY SURGERY CENTER | Age: 32
End: 2017-06-09

## 2017-06-09 ENCOUNTER — ANESTHESIA EVENT (OUTPATIENT)
Dept: SURGERY | Facility: AMBULATORY SURGERY CENTER | Age: 32
End: 2017-06-09

## 2017-06-09 ENCOUNTER — SURGERY (OUTPATIENT)
Age: 32
End: 2017-06-09

## 2017-06-09 ENCOUNTER — HOSPITAL ENCOUNTER (OUTPATIENT)
Facility: AMBULATORY SURGERY CENTER | Age: 32
End: 2017-06-09
Attending: OTOLARYNGOLOGY

## 2017-06-09 VITALS
SYSTOLIC BLOOD PRESSURE: 127 MMHG | OXYGEN SATURATION: 96 % | DIASTOLIC BLOOD PRESSURE: 68 MMHG | TEMPERATURE: 98.1 F | HEIGHT: 69 IN | BODY MASS INDEX: 34.8 KG/M2 | RESPIRATION RATE: 16 BRPM | WEIGHT: 235 LBS

## 2017-06-09 DIAGNOSIS — G89.18 POSTOPERATIVE PAIN: ICD-10-CM

## 2017-06-09 DIAGNOSIS — H90.2 HEARING LOSS, CONDUCTIVE: Primary | ICD-10-CM

## 2017-06-09 RX ORDER — ONDANSETRON 2 MG/ML
INJECTION INTRAMUSCULAR; INTRAVENOUS PRN
Status: DISCONTINUED | OUTPATIENT
Start: 2017-06-09 | End: 2017-06-09

## 2017-06-09 RX ORDER — CLINDAMYCIN PHOSPHATE 900 MG/50ML
900 INJECTION, SOLUTION INTRAVENOUS
Status: DISCONTINUED | OUTPATIENT
Start: 2017-06-09 | End: 2017-06-09 | Stop reason: HOSPADM

## 2017-06-09 RX ORDER — ONDANSETRON 4 MG/1
4 TABLET, ORALLY DISINTEGRATING ORAL EVERY 30 MIN PRN
Status: DISCONTINUED | OUTPATIENT
Start: 2017-06-09 | End: 2017-06-10 | Stop reason: HOSPADM

## 2017-06-09 RX ORDER — NALOXONE HYDROCHLORIDE 0.4 MG/ML
.1-.4 INJECTION, SOLUTION INTRAMUSCULAR; INTRAVENOUS; SUBCUTANEOUS
Status: DISCONTINUED | OUTPATIENT
Start: 2017-06-09 | End: 2017-06-10 | Stop reason: HOSPADM

## 2017-06-09 RX ORDER — HYDROCODONE BITARTRATE AND ACETAMINOPHEN 5; 325 MG/1; MG/1
1-2 TABLET ORAL
Status: COMPLETED | OUTPATIENT
Start: 2017-06-09 | End: 2017-06-09

## 2017-06-09 RX ORDER — GABAPENTIN 300 MG/1
300 CAPSULE ORAL ONCE
Status: COMPLETED | OUTPATIENT
Start: 2017-06-09 | End: 2017-06-09

## 2017-06-09 RX ORDER — FENTANYL CITRATE 50 UG/ML
25-50 INJECTION, SOLUTION INTRAMUSCULAR; INTRAVENOUS EVERY 5 MIN PRN
Status: DISCONTINUED | OUTPATIENT
Start: 2017-06-09 | End: 2017-06-09 | Stop reason: HOSPADM

## 2017-06-09 RX ORDER — KETOROLAC TROMETHAMINE 30 MG/ML
INJECTION, SOLUTION INTRAMUSCULAR; INTRAVENOUS PRN
Status: DISCONTINUED | OUTPATIENT
Start: 2017-06-09 | End: 2017-06-09

## 2017-06-09 RX ORDER — SODIUM CHLORIDE, SODIUM LACTATE, POTASSIUM CHLORIDE, CALCIUM CHLORIDE 600; 310; 30; 20 MG/100ML; MG/100ML; MG/100ML; MG/100ML
INJECTION, SOLUTION INTRAVENOUS CONTINUOUS
Status: DISCONTINUED | OUTPATIENT
Start: 2017-06-09 | End: 2017-06-10 | Stop reason: HOSPADM

## 2017-06-09 RX ORDER — BACITRACIN 500 [USP'U]/G
OINTMENT OPHTHALMIC PRN
Status: DISCONTINUED | OUTPATIENT
Start: 2017-06-09 | End: 2017-06-09 | Stop reason: HOSPADM

## 2017-06-09 RX ORDER — CLINDAMYCIN PHOSPHATE 900 MG/50ML
900 INJECTION, SOLUTION INTRAVENOUS SEE ADMIN INSTRUCTIONS
Status: DISCONTINUED | OUTPATIENT
Start: 2017-06-09 | End: 2017-06-09 | Stop reason: HOSPADM

## 2017-06-09 RX ORDER — ONDANSETRON 2 MG/ML
4 INJECTION INTRAMUSCULAR; INTRAVENOUS EVERY 30 MIN PRN
Status: DISCONTINUED | OUTPATIENT
Start: 2017-06-09 | End: 2017-06-10 | Stop reason: HOSPADM

## 2017-06-09 RX ORDER — PROPOFOL 10 MG/ML
INJECTION, EMULSION INTRAVENOUS CONTINUOUS PRN
Status: DISCONTINUED | OUTPATIENT
Start: 2017-06-09 | End: 2017-06-09

## 2017-06-09 RX ORDER — LIDOCAINE HYDROCHLORIDE 20 MG/ML
INJECTION, SOLUTION INFILTRATION; PERINEURAL PRN
Status: DISCONTINUED | OUTPATIENT
Start: 2017-06-09 | End: 2017-06-09

## 2017-06-09 RX ORDER — HYDROCODONE BITARTRATE AND ACETAMINOPHEN 5; 325 MG/1; MG/1
1 TABLET ORAL EVERY 4 HOURS PRN
Qty: 12 TABLET | Refills: 0 | Status: SHIPPED | OUTPATIENT
Start: 2017-06-09

## 2017-06-09 RX ORDER — GLYCOPYRROLATE 0.2 MG/ML
INJECTION, SOLUTION INTRAMUSCULAR; INTRAVENOUS PRN
Status: DISCONTINUED | OUTPATIENT
Start: 2017-06-09 | End: 2017-06-09

## 2017-06-09 RX ORDER — SODIUM CHLORIDE, SODIUM LACTATE, POTASSIUM CHLORIDE, CALCIUM CHLORIDE 600; 310; 30; 20 MG/100ML; MG/100ML; MG/100ML; MG/100ML
INJECTION, SOLUTION INTRAVENOUS CONTINUOUS
Status: DISCONTINUED | OUTPATIENT
Start: 2017-06-09 | End: 2017-06-09 | Stop reason: HOSPADM

## 2017-06-09 RX ORDER — MEPERIDINE HYDROCHLORIDE 25 MG/ML
12.5 INJECTION INTRAMUSCULAR; INTRAVENOUS; SUBCUTANEOUS
Status: DISCONTINUED | OUTPATIENT
Start: 2017-06-09 | End: 2017-06-10 | Stop reason: HOSPADM

## 2017-06-09 RX ORDER — OFLOXACIN 3 MG/ML
5 SOLUTION AURICULAR (OTIC) 2 TIMES DAILY
Qty: 10 ML | Refills: 0 | Status: SHIPPED | OUTPATIENT
Start: 2017-06-16 | End: 2017-06-30

## 2017-06-09 RX ORDER — FENTANYL CITRATE 50 UG/ML
INJECTION, SOLUTION INTRAMUSCULAR; INTRAVENOUS PRN
Status: DISCONTINUED | OUTPATIENT
Start: 2017-06-09 | End: 2017-06-09

## 2017-06-09 RX ORDER — DEXAMETHASONE SODIUM PHOSPHATE 10 MG/ML
10 INJECTION, SOLUTION INTRAMUSCULAR; INTRAVENOUS ONCE
Status: COMPLETED | OUTPATIENT
Start: 2017-06-09 | End: 2017-06-09

## 2017-06-09 RX ORDER — ACETAMINOPHEN 325 MG/1
975 TABLET ORAL ONCE
Status: COMPLETED | OUTPATIENT
Start: 2017-06-09 | End: 2017-06-09

## 2017-06-09 RX ORDER — ACETAMINOPHEN 325 MG/1
650 TABLET ORAL
Status: DISCONTINUED | OUTPATIENT
Start: 2017-06-09 | End: 2017-06-10 | Stop reason: HOSPADM

## 2017-06-09 RX ORDER — PROPOFOL 10 MG/ML
INJECTION, EMULSION INTRAVENOUS PRN
Status: DISCONTINUED | OUTPATIENT
Start: 2017-06-09 | End: 2017-06-09

## 2017-06-09 RX ORDER — LIDOCAINE 40 MG/G
CREAM TOPICAL
Status: DISCONTINUED | OUTPATIENT
Start: 2017-06-09 | End: 2017-06-09 | Stop reason: HOSPADM

## 2017-06-09 RX ADMIN — SODIUM CHLORIDE, SODIUM LACTATE, POTASSIUM CHLORIDE, CALCIUM CHLORIDE: 600; 310; 30; 20 INJECTION, SOLUTION INTRAVENOUS at 10:29

## 2017-06-09 RX ADMIN — KETOROLAC TROMETHAMINE 30 MG: 30 INJECTION, SOLUTION INTRAMUSCULAR; INTRAVENOUS at 12:50

## 2017-06-09 RX ADMIN — FENTANYL CITRATE 50 MCG: 50 INJECTION, SOLUTION INTRAMUSCULAR; INTRAVENOUS at 11:43

## 2017-06-09 RX ADMIN — CLINDAMYCIN PHOSPHATE 900 MG: 900 INJECTION, SOLUTION INTRAVENOUS at 11:43

## 2017-06-09 RX ADMIN — FENTANYL CITRATE 50 MCG: 50 INJECTION, SOLUTION INTRAMUSCULAR; INTRAVENOUS at 12:07

## 2017-06-09 RX ADMIN — PROPOFOL 180 MCG/KG/MIN: 10 INJECTION, EMULSION INTRAVENOUS at 11:46

## 2017-06-09 RX ADMIN — GABAPENTIN 300 MG: 300 CAPSULE ORAL at 10:29

## 2017-06-09 RX ADMIN — HYDROCODONE BITARTRATE AND ACETAMINOPHEN 1 TABLET: 5; 325 TABLET ORAL at 13:53

## 2017-06-09 RX ADMIN — ONDANSETRON 4 MG: 2 INJECTION INTRAMUSCULAR; INTRAVENOUS at 11:50

## 2017-06-09 RX ADMIN — PROPOFOL 200 MG: 10 INJECTION, EMULSION INTRAVENOUS at 11:43

## 2017-06-09 RX ADMIN — GLYCOPYRROLATE 0.2 MG: 0.2 INJECTION, SOLUTION INTRAMUSCULAR; INTRAVENOUS at 11:40

## 2017-06-09 RX ADMIN — LIDOCAINE HYDROCHLORIDE 60 MG: 20 INJECTION, SOLUTION INFILTRATION; PERINEURAL at 11:43

## 2017-06-09 RX ADMIN — DEXAMETHASONE SODIUM PHOSPHATE 10 MG: 10 INJECTION, SOLUTION INTRAMUSCULAR; INTRAVENOUS at 11:40

## 2017-06-09 RX ADMIN — FENTANYL CITRATE 25 MCG: 50 INJECTION, SOLUTION INTRAMUSCULAR; INTRAVENOUS at 13:58

## 2017-06-09 RX ADMIN — PROPOFOL: 10 INJECTION, EMULSION INTRAVENOUS at 12:11

## 2017-06-09 RX ADMIN — ACETAMINOPHEN 975 MG: 325 TABLET ORAL at 10:28

## 2017-06-09 RX ADMIN — BACITRACIN 1 APPLICATOR: 500 OINTMENT OPHTHALMIC at 13:11

## 2017-06-09 RX ADMIN — FENTANYL CITRATE 25 MCG: 50 INJECTION, SOLUTION INTRAMUSCULAR; INTRAVENOUS at 13:53

## 2017-06-09 ASSESSMENT — COPD QUESTIONNAIRES: COPD: 0

## 2017-06-09 ASSESSMENT — LIFESTYLE VARIABLES: TOBACCO_USE: 0

## 2017-06-09 NOTE — IP AVS SNAPSHOT
[facility]    [address] [place] [address]    Phone:  [phone]    Fax:  [phone]                                       After Visit Summary   6/9/2017    Mikhail Bustos    MRN: [id number]           After Visit Summary Signature Page     I have received my discharge instructions, and my questions have been answered. I have discussed any challenges I see with this plan with the nurse or doctor.    ..........................................................................................................................................  Patient/Patient Representative Signature      ..........................................................................................................................................  Patient Representative Print Name and Relationship to Patient    ..................................................               ................................................  Date                                            Time    ..........................................................................................................................................  Reviewed by Signature/Title    ...................................................              ..............................................  Date                                                            Time

## 2017-06-09 NOTE — OP NOTE
Date of service:  6/09/17    Preoperative diagnosis:  Right conductive hearing loss    Postoperative diagnosis:  Right conductive hearing loss    Procedure:  1.  Right tympanoplasty with cartilage backing with ossicular chain reconstruction using cartilage graft  2.  Facial nerve monitoring x 1.5 hours    Surgeon:  Maria Guadalupe Bashir MD    Resident:  Usman Espinoza MD    Anesthesia:  General    EBL:  5cc    Specimens:  None    Complications:  None    Findings:  Fibrous long process of the incus with the end of the long process fused to the stapes capitulum.  Stapes mobile.    Indications:  Mikhail Bustos is a patient with a right conductive hearing loss which he reports since childhood.  Discussion was had about tympanoplasty with middle ear exploration and possible ossicular chain reconstruction.  CT temporal bones showed a likely abnormality of the long process of the incus.  Risks and benefits had been discussed and consent had been obtained.    Procedure:  The patient was taken to the operating room and placed supine on the operating room table.  General anesthesia was induced and endotracheal intubation was performed.  Time Out was then performed with confirmation of the patient, site and procedure.  Facial nerve electrodes were placed on the right side of the face.  Impedances were checked and the nerve was monitored for the entirety of the case.  1:100,000 epinephrine was injected into the ear canal and tragus.  The area was prepped and draped in standard surgical fashion.  The operating microscope was brought into position and used for the entirety of the case.  The ear canal was inspected and irrigated with normal saline.  The tympanic membrane was inspected.  It is intact with a well aerated middle ear, a few small areas of myringosclerosis.  Canal incisions were made and the tympanomeatal flap was elevated.  The middle ear was entered inferiorly and was noted to be clear and healthy.  The tympanic membrane was  elevated superiorly.  The chorda tympani was identified and preserved.  The scutum was curetted slightly.  The ossicular chain was identified.  There is noted to be a fibrous band instead of a bony long process of the incus.  The body is intact and the end of the long process is fused to the stapes capitulum.  The stapes is intact and mobile.  When the malleus was palpated, the incus moved as did the fibrous band but the band did not really move the stapes.  The fibrous band was excised.  A large 2.5mm Randal prosthesis was brought onto the field but it was too short to reach the body of the incus and the bottom did not fit onto the remainder of the somewhat large and bulbous end of the incus long process.  A 2mm PORP sizer was brought onto the field and was noted to be too tall and the base was too small due to the fused end of the incus long process.  Therefore, the decision was made to use a cartilage graft for ossicular reconstruction.  Tragal cartilage was then harvested and prepared.  A small piece was left thick and a larger piece was thinned to back the tympanic membrane.  The eustachian tube orifice was packed with gelfoam and gelfoam packed into the hypotympanum and around the stapes.  The smaller thick piece of cartilage was laid onto the top of the stapes and remnant long process with the perichondrium against the long process.  The thinner piece was then placed on top of this with the periochondrium against the tympanic membrane.  There was noted to be good contact between all 3 structures.  The tympanomeatal flap was placed back into position and gelfoam used to seal the incision.  Bacitracin was used to fill the ear canal.  A cotton ball was placed over the ear canal and the facial nerve electrodes removed.  The patient tolerated the procedure well and counts were correct.  The patient was returned to Anesthesia, awakened, extubated and taken to the PACU in stable condition.

## 2017-06-09 NOTE — ANESTHESIA POSTPROCEDURE EVALUATION
Patient: Mikhail Bustos    Procedure(s):  Right Ear Cartilage Backed Tympanoplasty with Ossicular Chain Reconstruction. - Wound Class: II-Clean Contaminated   Right Ear Cartilage Backed Tympanoplasty with Ossicular Chain Reconstruction. - Wound Class: II-Clean Contaminated    Diagnosis:Conductive Hearing Loss  Diagnosis Additional Information: No value filed.    Anesthesia Type:  General, ETT    Note:  Anesthesia Post Evaluation    Patient location during evaluation: Phase 2  Patient participation: Able to fully participate in evaluation  Level of consciousness: awake and alert  Pain management: adequate  Airway patency: patent  Cardiovascular status: acceptable  Respiratory status: acceptable  Hydration status: acceptable  PONV: none     Anesthetic complications: None    Comments: Doing well, some dizziness but denies nausea.        Last vitals:  Vitals:    06/09/17 1405 06/09/17 1415 06/09/17 1430   BP: 104/66 124/72 131/71   Resp: 14 14 16   Temp: 36.4  C (97.6  F)     SpO2: 96% 94% 95%         Electronically Signed By: Bogdan Wall MD  June 9, 2017  2:34 PM

## 2017-06-09 NOTE — ANESTHESIA PREPROCEDURE EVALUATION
Mikhail Bustos is a 32 year old male with a PMH of  Conductive Hearing Loss who is scheduled for Procedure(s):  Right Exploratory Tympanotomy Possible Ossicular Chain Reconstruction Possible Cartilage Back Tympanoplasty - Wound Class: II-Clean Contaminated   - Wound Class: II-Clean Contaminated   - Wound Class: II-Clean Contaminated    NPO Status: Adequate.  > 6 hours solids, > 2 hours clear liquids.       History reviewed. No pertinent surgical history.      Anesthesia Evaluation     . Pt has had prior anesthetic. Type: General    No history of anesthetic complications          ROS/MED HX    ENT/Pulmonary:      (-) tobacco use, asthma and COPD   Neurologic:      (-) CVA, TIA and Neuropathy   Cardiovascular:        (-) hypertension, CAD, irregular heartbeat/palpitations and stent   METS/Exercise Tolerance:     Hematologic:        (-) anemia   Musculoskeletal:         GI/Hepatic:     (+) hepatitis type C,      (-) GERD and liver disease   Renal/Genitourinary:      (-) renal disease   Endo:      (-) Type I DM, Type II DM and thyroid disease   Psychiatric:     (+) psychiatric history (hx of EtOH and meth abuse, 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    ASA Status:  2 .        Plan for General and ETT with Intravenous induction. Maintenance will be Balanced.    PONV prophylaxis:  Ondansetron (or other 5HT-3) and Dexamethasone or Solumedrol       Postoperative Care  Postoperative pain management:  Oral pain  medications and Multi-modal analgesia.      Consents  Anesthetic plan, risks, benefits and alternatives discussed with:  Patient..        Bogdan Wall MD  10:54 AM June 9, 2017                       .

## 2017-06-09 NOTE — IP AVS SNAPSHOT
MRN:[id number]                      After Visit Summary   6/9/2017    Mikhail Bustos    MRN: [id number]           Thank you!     Thank you for choosing [facility] for your care. Our goal is always to provide you with excellent care. Hearing back from our patients is one way we can continue to improve our services. Please take a few minutes to complete the written survey that you may receive in the mail after you visit with us. Thank you!        Patient Information     Date Of Birth          1985        About your hospital stay     You were admitted on:  June 9, 2017 You last received care in the:  [facility]    You were discharged on:  June 9, 2017       Who to Call     For medical emergencies, please call 911.  For non-urgent questions about your medical care, please call your primary care provider or clinic, [phone]  For questions related to your surgery, please call your surgery clinic        Attending Provider     Provider Specialty    Maria Guadalupe Bashir MD Otolaryngology       Primary Care Provider Office Phone # Fax #    Ming Cervantes [phone]      Your next 10 appointments already scheduled     Jun 29, 2017  2:45 PM CDT   (Arrive by 2:30 PM)   Return Visit with Maria Guadalupe Bashir MD   [facility] ([facility])    [address] [place] [id number] [phone]            Jul 26, 2017  1:10 PM CDT   (Arrive by 12:55 PM)   New Patient Visit with Alex Rg MD   [facility] ([facility])    [address] [place] [id number] [phone]              Further instructions from your care team       1. Resume your home medications. Take narcotic pain medication as indicated. May use either ibuprofen or Tylenol every 6 hours with or between narcotic doses and continue as needed. Use docusate  "to avoid constipation. Start your ear drops in 1 week after surgery and use until your follow up.    2. Wound care  * Change the cotton ball in your ear as needed for drainage.  It's normal to expect some drainage from your ear for the first few days after surgery.    3. No shower until 48 hours after surgery. Keep incision clean and dry, do not scrub, let water and soap run over incision.  Use a cotton ball coated with vasoline to keep water out of ear canal.    4. For the next 2 weeks, no heavy lifting more than 5 pounds, no strenuous activities. No nose blowing. Sneeze with your mouth open.    5. Please call MD or come to the ED for shortness of breath, trouble breathing, inability to tolerate liquids, intractable dizziness, or signs of infection such as fevers or redness.      Call the [phone] clinic number if you have any questions and concerns during the day and call [phone] at night and ask for \"ENT resident on call\".     6. Follow up with Dr. Monge as previously scheduled in 3 weeks.     [facility]  Home Care Following Anesthesia  For 24 hours after surgery:  1. Get plenty of rest.  A responsible adult must stay with you for at least 24 hours after you leave the surgery center.  2. Do not drive or use heavy equipment.  If you have weakness or tingling, don't drive or use heavy equipment until this feeling goes away.   3. Do not drink alcohol.   4. Avoid strenuous or risky activities.  Ask for help when climbing stairs.  5. You may feel lightheaded.  IF so, sit for a few minutes before standing.  Have someone help you get up.   6. If you have nausea (feel sick to your stomach): Drink only clear liquids such as apple juice, ginger ale, broth or 7-Up.  Rest may also help.  Be sure to drink enough fluids.  Move to a regular diet as you feel able.   7. You may have a slight fever.  Call the doctor if your fever is over 100 F (37.7 C) (taken under the tongue) or " "lasts longer than 24 hours.  8. You may have a dry mouth, a sore throat, muscle aches or trouble sleeping. These should go away after 24 hours.  9. Do not make important or legal decisions.               Tips for taking pain medications  To get the best pain relief possible, remember these points:    Take pain medications as directed, before pain becomes severe.    Pain medication can upset your stomach: taking it with food may help.    Constipation is a common side effect of pain medication. Drink plenty of  fluids.    Eat foods high in fiber. Take a stool softener if recommended by your doctor or pharmacist.    Do not drink alcohol, drive or operate machinery while taking pain medications.    Ask about other ways to control pain, such as with heat, ice or relaxation.    Call a doctor for any of the followin. Signs of infection (fever, growing tenderness at the surgery site, a large amount of drainage or bleeding, severe pain, foul-smelling drainage, redness, swelling).  2. It has been over 8 to 10 hours since surgery and you are still not able to urinate (pass water).  3. Headache for over 24 hours.  4. Numbness, tingling or weakness the day after surgery (if you had spinal anesthesia).  Your doctor is:  Dr. Maria Guadalupe Bashir, ENT Otolaryngology: [phone]                    Or dial [phone] and ask for the resident on call for:  ENT Otolaryngology  For emergency care, call the:  [facility]:  [phone] (TTY for hearing impaired: [phone])                    Pending Results     No orders found from 2017 to 6/10/2017.            Admission Information     Date & Time Provider Department Dept. Phone    2017 Maria Guadalupe Bashir MD [facility] [phone]      Your Vitals Were     Blood Pressure Temperature Respirations Height Weight Pulse Oximetry    140/84 97.7  F (36.5  C) (Oral) 16 1.753 m (5' 9\") 106.6 kg (235 lb) 96%    BMI (Body Mass Index)             "       34.7 kg/m2           [employer] Information     D-Sight is an electronic gateway that provides easy, online access to your medical records. With D-Sight, you can request a clinic appointment, read your test results, renew a prescription or communicate with your care team.     To sign up for D-Sight visit the website at [email or web address]   You will be asked to enter the access code listed below, as well as some personal information. Please follow the directions to create your username and password.     Your access code is: [email or web address]  Expires: 2017  6:30 AM     Your access code will  in 90 days. If you need help or a new code, please contact your [facility] or call [phone] for assistance.        Care EveryWhere ID     This is your Care EveryWhere ID. This could be used by other organizations to access your [facility] medical records  [id number]           Review of your medicines      START taking        Dose / Directions    HYDROcodone-acetaminophen 5-325 MG per tablet   Commonly known as:  NORCO   Used for:  Postoperative pain        Dose:  1 tablet   Take 1 tablet by mouth every 4 hours as needed for moderate to severe pain maximum 6 tablet(s) per day   Quantity:  12 tablet   Refills:  0       ofloxacin 0.3 % otic solution   Commonly known as:  FLOXIN   Used for:  Hearing loss, conductive        Dose:  5 drop   Start taking on:  2017   Place 5 drops into the right ear 2 times daily for 14 days   Quantity:  10 mL   Refills:  0         CONTINUE these medicines which have NOT CHANGED        Dose / Directions    FISH OIL PO        Refills:  0       FLONASE 50 MCG/ACT spray   Generic drug:  fluticasone        Refills:  0       ibuprofen 600 MG tablet   Commonly known as:  ADVIL/MOTRIN   Used for:  Chronic prostatitis        Dose:  600 mg   Take 1 tablet (600 mg) by mouth every 6 hours as needed for moderate pain   Quantity:  60 tablet   Refills:  1        ZYRTEC ALLERGY 10 MG tablet   Generic drug:  cetirizine        Refills:  0            Where to get your medicines      These medications were sent to [place], [place] - [address]  [address], [place] [address]    Hours:  TRANSPLANT PHONE NUMBER [phone] Phone:  [phone]     ofloxacin 0.3 % otic solution         Some of these will need a paper prescription and others can be bought over the counter. Ask your nurse if you have questions.     Bring a paper prescription for each of these medications     HYDROcodone-acetaminophen 5-325 MG per tablet                Protect others around you: Learn how to safely use, store and throw away your medicines at www.disposemymeds.org.             Medication List: This is a list of all your medications and when to take them. Check marks below indicate your daily home schedule. Keep this list as a reference.      Medications           Morning Afternoon Evening Bedtime As Needed    FISH OIL PO                                FLONASE 50 MCG/ACT spray   Generic drug:  fluticasone                                HYDROcodone-acetaminophen 5-325 MG per tablet   Commonly known as:  NORCO   Take 1 tablet by mouth every 4 hours as needed for moderate to severe pain maximum 6 tablet(s) per day                                ibuprofen 600 MG tablet   Commonly known as:  ADVIL/MOTRIN   Take 1 tablet (600 mg) by mouth every 6 hours as needed for moderate pain                                ofloxacin 0.3 % otic solution   Commonly known as:  FLOXIN   Place 5 drops into the right ear 2 times daily for 14 days   Start taking on:  6/16/2017                                ZYRTEC ALLERGY 10 MG tablet   Generic drug:  cetirizine

## 2017-06-09 NOTE — ANESTHESIA CARE TRANSFER NOTE
Patient: Mikhail Bustos    Procedure(s):  Right Ear Cartilage Backed Tympanoplasty with Ossicular Chain Reconstruction. - Wound Class: II-Clean Contaminated   Right Ear Cartilage Backed Tympanoplasty with Ossicular Chain Reconstruction. - Wound Class: II-Clean Contaminated    Diagnosis: Conductive Hearing Loss  Diagnosis Additional Information: No value filed.    Anesthesia Type:   General, ETT     Note:  Airway :Face Mask  Patient transferred to:PACU  Comments: To PACU pt. Alert O2 via face mask @ 8 liters. Report given to RN      Vitals: (Last set prior to Anesthesia Care Transfer)    CRNA VITALS  6/9/2017 1309 - 6/9/2017 1339      6/9/2017             Resp Rate (set): 10                Electronically Signed By: EVERARDO Valentin CRNA  June 9, 2017  1:39 PM

## 2017-06-09 NOTE — DISCHARGE INSTRUCTIONS
"1. Resume your home medications. Take narcotic pain medication as indicated. May use either ibuprofen or Tylenol every 6 hours with or between narcotic doses and continue as needed. Use docusate to avoid constipation. Start your ear drops in 1 week after surgery and use until your follow up.    2. Wound care  * Change the cotton ball in your ear as needed for drainage.  It's normal to expect some drainage from your ear for the first few days after surgery.    3. No shower until 48 hours after surgery. Keep incision clean and dry, do not scrub, let water and soap run over incision.  Use a cotton ball coated with vasoline to keep water out of ear canal.    4. For the next 2 weeks, no heavy lifting more than 5 pounds, no strenuous activities. No nose blowing. Sneeze with your mouth open.    5. Please call MD or come to the ED for shortness of breath, trouble breathing, inability to tolerate liquids, intractable dizziness, or signs of infection such as fevers or redness.      Call the [phone] clinic number if you have any questions and concerns during the day and call [phone] at night and ask for \"ENT resident on call\".     6. Follow up with Dr. Monge as previously scheduled in 3 weeks.     [facility]  Home Care Following Anesthesia  For 24 hours after surgery:  1. Get plenty of rest.  A responsible adult must stay with you for at least 24 hours after you leave the surgery center.  2. Do not drive or use heavy equipment.  If you have weakness or tingling, don't drive or use heavy equipment until this feeling goes away.   3. Do not drink alcohol.   4. Avoid strenuous or risky activities.  Ask for help when climbing stairs.  5. You may feel lightheaded.  IF so, sit for a few minutes before standing.  Have someone help you get up.   6. If you have nausea (feel sick to your stomach): Drink only clear liquids such as apple juice, ginger ale, broth or 7-Up.  Rest may also help. "  Be sure to drink enough fluids.  Move to a regular diet as you feel able.   7. You may have a slight fever.  Call the doctor if your fever is over 100 F (37.7 C) (taken under the tongue) or lasts longer than 24 hours.  8. You may have a dry mouth, a sore throat, muscle aches or trouble sleeping. These should go away after 24 hours.  9. Do not make important or legal decisions.               Tips for taking pain medications  To get the best pain relief possible, remember these points:    Take pain medications as directed, before pain becomes severe.    Pain medication can upset your stomach: taking it with food may help.    Constipation is a common side effect of pain medication. Drink plenty of  fluids.    Eat foods high in fiber. Take a stool softener if recommended by your doctor or pharmacist.    Do not drink alcohol, drive or operate machinery while taking pain medications.    Ask about other ways to control pain, such as with heat, ice or relaxation.    Call a doctor for any of the followin. Signs of infection (fever, growing tenderness at the surgery site, a large amount of drainage or bleeding, severe pain, foul-smelling drainage, redness, swelling).  2. It has been over 8 to 10 hours since surgery and you are still not able to urinate (pass water).  3. Headache for over 24 hours.  4. Numbness, tingling or weakness the day after surgery (if you had spinal anesthesia).  Your doctor is:  Dr. Maria Guadalupe Bashir, ENT Otolaryngology: [phone]                    Or dial [phone] and ask for the resident on call for:  ENT Otolaryngology  For emergency care, call the:  [facility]:  [phone] (TTY for hearing impaired: [phone])

## 2017-07-11 ENCOUNTER — CARE COORDINATION (OUTPATIENT)
Dept: OTOLARYNGOLOGY | Facility: CLINIC | Age: 32
End: 2017-07-11

## 2017-07-11 NOTE — PROGRESS NOTES
Pt missed his post op appt 6-10-17. Message left on [employer] coordinator voice mail ( Marco Nevarez [phone]) regarding rescheduling post op-  Awaiting call back. --Phuong Castorena RN  ENT Care Coordinator   Otology  [phone]

## 2017-07-18 ENCOUNTER — PRE VISIT (OUTPATIENT)
Dept: ANESTHESIOLOGY | Facility: CLINIC | Age: 32
End: 2017-07-18

## 2017-07-18 NOTE — TELEPHONE ENCOUNTER
1.  Date/reason for appt: 7/26/17 - Pelvic Pain in Male  2.  Referring provider: Dr. Quintero  3.  Call to patient (Yes / No - short description): no, internal referral  4.  Previous care at / records requested from: [facility]- records and referral in epic

## 2017-07-25 ENCOUNTER — TELEPHONE (OUTPATIENT)
Dept: ANESTHESIOLOGY | Facility: CLINIC | Age: 32
End: 2017-07-25

## 2017-07-25 NOTE — TELEPHONE ENCOUNTER
Reminder call placed to pt.   Pt reminded of Provider, date and time of the appointment.   Pt was asked to arrive 15 minutes early to fill out the questionnaires.   Clinic phone number provided if pt needed to reschedule.     Melanie Jimenez, CMA

## 2017-07-27 ENCOUNTER — CARE COORDINATION (OUTPATIENT)
Dept: OTOLARYNGOLOGY | Facility: CLINIC | Age: 32
End: 2017-07-27

## 2017-07-27 NOTE — PROGRESS NOTES
Called made to cell # message left : pt missed post op appt, please call for clinic appt.  Phuong Castorena RN    ENT Care Coordinator   Otology  [phone]  7/27/2017 2:52 PM

## 2024-12-26 NOTE — PATIENT INSTRUCTIONS
Schedule imaging and follow up with Dr Quintero to discuss results    It was a pleasure meeting with you today.  Thank you for allowing me and my team the privilege of caring for you today.  YOU are the reason we are here, and I truly hope we provided you with the excellent service you deserve.  Please let us know if there is anything else we can do for you so that we can be sure you are leaving completely satisfied with your care experience.          
None

## (undated) DEVICE — SUCTION MANIFOLD NEPTUNE 2 SYS 4 PORT 0702-020-000

## (undated) DEVICE — IMPLANTABLE DEVICE: Type: IMPLANTABLE DEVICE | Site: EAR | Status: NON-FUNCTIONAL

## (undated) DEVICE — DRAPE MICROSCOPE LEICA 54X150" AR8033650

## (undated) DEVICE — PACK EAR CUSTOM ASC

## (undated) DEVICE — PREP POVIDONE IODINE SCRUB 7.5% 120ML

## (undated) DEVICE — DRAPE EAR CHRONIC LATEX FREE 3609

## (undated) DEVICE — DRAPE WARMER 66X44" ORS-300

## (undated) DEVICE — DRSG BANDAID 1X3" FABRIC CURITY LATEX FREE KC44101

## (undated) DEVICE — PREP POVIDONE IODINE SOLUTION 10% 120ML

## (undated) DEVICE — DRSG COTTON BALL 6PK LCB62

## (undated) DEVICE — LABEL MEDICATION SYSTEM 3303-P

## (undated) DEVICE — WIPE INSTRUMENT MEROCEL 400200

## (undated) DEVICE — NIM ELEC SUBDERMAL NDL 3PAIR/BOX

## (undated) DEVICE — SOL WATER IRRIG 1000ML BOTTLE 2F7114

## (undated) DEVICE — BLADE KNIFE BEAVER TYMPANOPLASTY 2.5MM W/60D DOWN 377200

## (undated) DEVICE — ESU GROUND PAD ADULT W/CORD E7507

## (undated) DEVICE — SOL NACL 0.9% IRRIG 1000ML BOTTLE 2F7124

## (undated) DEVICE — LINEN TOWEL PACK X5 5464

## (undated) DEVICE — BLADE CLIPPER SGL USE 9680

## (undated) DEVICE — GLOVE PROTEXIS POWDER FREE SMT 6.5  2D72PT65X

## (undated) DEVICE — ESU ELEC BLADE 2.75" COATED/INSULATED E1455

## (undated) DEVICE — SYR 10ML LL W/O NDL

## (undated) DEVICE — SPONGE SURGIFOAM 100 1974

## (undated) DEVICE — PREP SKIN SCRUB TRAY 4461A

## (undated) DEVICE — BLADE KNIFE CARTILAGE KURZ 8000140

## (undated) DEVICE — DRSG BANDAID 1X3" PLASTIC LATEX FREE

## (undated) RX ORDER — PROPOFOL 10 MG/ML
INJECTION, EMULSION INTRAVENOUS
Status: DISPENSED
Start: 2017-06-09

## (undated) RX ORDER — HYDROCODONE BITARTRATE AND ACETAMINOPHEN 5; 325 MG/1; MG/1
TABLET ORAL
Status: DISPENSED
Start: 2017-06-09

## (undated) RX ORDER — REMIFENTANIL HYDROCHLORIDE 1 MG/ML
INJECTION, POWDER, LYOPHILIZED, FOR SOLUTION INTRAVENOUS
Status: DISPENSED
Start: 2017-06-09

## (undated) RX ORDER — CLINDAMYCIN PHOSPHATE 900 MG/50ML
INJECTION, SOLUTION INTRAVENOUS
Status: DISPENSED
Start: 2017-06-09

## (undated) RX ORDER — FENTANYL CITRATE 50 UG/ML
INJECTION, SOLUTION INTRAMUSCULAR; INTRAVENOUS
Status: DISPENSED
Start: 2017-06-09

## (undated) RX ORDER — BACITRACIN 500 [USP'U]/G
OINTMENT OPHTHALMIC
Status: DISPENSED
Start: 2017-06-09

## (undated) RX ORDER — ACETAMINOPHEN 325 MG/1
TABLET ORAL
Status: DISPENSED
Start: 2017-06-09

## (undated) RX ORDER — EPINEPHRINE NASAL SOLUTION 1 MG/ML
SOLUTION NASAL
Status: DISPENSED
Start: 2017-06-09

## (undated) RX ORDER — DEXAMETHASONE SODIUM PHOSPHATE 10 MG/ML
INJECTION, SOLUTION INTRAMUSCULAR; INTRAVENOUS
Status: DISPENSED
Start: 2017-06-09

## (undated) RX ORDER — KETOROLAC TROMETHAMINE 30 MG/ML
INJECTION, SOLUTION INTRAMUSCULAR; INTRAVENOUS
Status: DISPENSED
Start: 2017-06-09

## (undated) RX ORDER — GABAPENTIN 300 MG/1
CAPSULE ORAL
Status: DISPENSED
Start: 2017-06-09

## (undated) RX ORDER — ONDANSETRON 2 MG/ML
INJECTION INTRAMUSCULAR; INTRAVENOUS
Status: DISPENSED
Start: 2017-06-09